# Patient Record
Sex: FEMALE | Race: WHITE | NOT HISPANIC OR LATINO
[De-identification: names, ages, dates, MRNs, and addresses within clinical notes are randomized per-mention and may not be internally consistent; named-entity substitution may affect disease eponyms.]

---

## 2020-08-10 ENCOUNTER — TRANSCRIPTION ENCOUNTER (OUTPATIENT)
Age: 45
End: 2020-08-10

## 2021-04-14 ENCOUNTER — TRANSCRIPTION ENCOUNTER (OUTPATIENT)
Age: 46
End: 2021-04-14

## 2021-10-14 PROBLEM — Z00.00 ENCOUNTER FOR PREVENTIVE HEALTH EXAMINATION: Status: ACTIVE | Noted: 2021-10-14

## 2021-11-04 ENCOUNTER — APPOINTMENT (OUTPATIENT)
Dept: OBGYN | Facility: CLINIC | Age: 46
End: 2021-11-04
Payer: COMMERCIAL

## 2021-11-04 VITALS
WEIGHT: 185 LBS | HEIGHT: 64 IN | BODY MASS INDEX: 31.58 KG/M2 | DIASTOLIC BLOOD PRESSURE: 80 MMHG | SYSTOLIC BLOOD PRESSURE: 128 MMHG

## 2021-11-04 PROCEDURE — 36415 COLL VENOUS BLD VENIPUNCTURE: CPT

## 2021-11-04 PROCEDURE — 58120 DILATION AND CURETTAGE: CPT

## 2021-11-04 PROCEDURE — 99386 PREV VISIT NEW AGE 40-64: CPT | Mod: 25

## 2021-11-04 PROCEDURE — 82270 OCCULT BLOOD FECES: CPT

## 2021-11-04 NOTE — PHYSICAL EXAM

## 2021-11-04 NOTE — PLAN
[FreeTextEntry1] : LORI RODRIGUEZ 45 year old female menorrhagia\par \par Routine Gyn Exam:\par BSE taught\par Pap/HPV conducted\par Rx given for mammogram and breast sonogram**\par Rx TVS\par D&C done due to menorrhagia\par FSH, LH, Tsh Free T4, Estradiol, CBC anemia panel, HCG\par Rx DEXA Ca/Vit D 1000mcg, nutrition, 30 min weight bearing exercise discussed with patient\par Advised pt. to schedule colonoscopy**\par RTO in 1 year or PRN\par

## 2021-11-04 NOTE — HISTORY OF PRESENT ILLNESS
[FreeTextEntry1] : 2021. 45 year old female  LMP 10/22/21 PMH none. She presents for annual gyn exam and offers no complaints. Regular menses but very heavy goes thru clothes and occasional dizziness. Denies breakthrough bleeding, abnormal vaginal discharge.  She reports no urinary or bowel complaints or blood stools.\par \par Reviewed last mammogram/br sono 2016\par Reviewed last pap smear 2016\par Reviewed last pelvic sonogram from  normal\par Reviewed and assessed need for colon screening 7 years ago\par Reviewed patient's current medications and allergies\par \par Denies changes in medical status, medications, serious illness, hospitalizations and surgeries\par \par No Fam hx of Breast , Colon, Uterine or Ovarian Ca\par

## 2021-11-05 LAB — HPV HIGH+LOW RISK DNA PNL CVX: NOT DETECTED

## 2021-11-12 DIAGNOSIS — B00.1 HERPESVIRAL VESICULAR DERMATITIS: ICD-10-CM

## 2021-11-19 LAB
BASOPHILS # BLD AUTO: 0.08 K/UL
BASOPHILS NFR BLD AUTO: 0.8 %
CYTOLOGY CVX/VAG DOC THIN PREP: ABNORMAL
EOSINOPHIL # BLD AUTO: 0.13 K/UL
EOSINOPHIL NFR BLD AUTO: 1.4 %
ESTRADIOL SERPL-MCNC: 82 PG/ML
FERRITIN SERPL-MCNC: 19 NG/ML
FOLATE SERPL-MCNC: 12.9 NG/ML
FSH SERPL-MCNC: 8.9 IU/L
HAPTOGLOB SERPL-MCNC: 186 MG/DL
HCT VFR BLD CALC: 42.8 %
HGB A MFR BLD: 97.8 %
HGB A2 MFR BLD: 2.2 %
HGB BLD-MCNC: 13.3 G/DL
HGB FRACT BLD-IMP: NORMAL
IMM GRANULOCYTES NFR BLD AUTO: 0.3 %
IRON SATN MFR SERPL: 14 %
IRON SERPL-MCNC: 69 UG/DL
LH SERPL-ACNC: 9.7 IU/L
LYMPHOCYTES # BLD AUTO: 2.84 K/UL
LYMPHOCYTES NFR BLD AUTO: 29.9 %
MAN DIFF?: NORMAL
MCHC RBC-ENTMCNC: 27 PG
MCHC RBC-ENTMCNC: 31.1 GM/DL
MCV RBC AUTO: 86.8 FL
MONOCYTES # BLD AUTO: 0.71 K/UL
MONOCYTES NFR BLD AUTO: 7.5 %
NEUTROPHILS # BLD AUTO: 5.71 K/UL
NEUTROPHILS NFR BLD AUTO: 60.1 %
PLATELET # BLD AUTO: 395 K/UL
RBC # BLD: 4.93 M/UL
RBC # BLD: 4.93 M/UL
RBC # FLD: 19.3 %
RETICS # AUTO: 1.8 %
RETICS AGGREG/RBC NFR: 88.2 K/UL
T4 FREE SERPL-MCNC: 1.3 NG/DL
TIBC SERPL-MCNC: 495 UG/DL
TSH SERPL-ACNC: 0.96 UIU/ML
UIBC SERPL-MCNC: 426 UG/DL
VIT B12 SERPL-MCNC: 571 PG/ML
WBC # FLD AUTO: 9.5 K/UL

## 2021-12-03 ENCOUNTER — ASOB RESULT (OUTPATIENT)
Age: 46
End: 2021-12-03

## 2021-12-03 ENCOUNTER — APPOINTMENT (OUTPATIENT)
Dept: OBGYN | Facility: CLINIC | Age: 46
End: 2021-12-03
Payer: COMMERCIAL

## 2021-12-03 PROCEDURE — 76830 TRANSVAGINAL US NON-OB: CPT

## 2021-12-21 ENCOUNTER — APPOINTMENT (OUTPATIENT)
Dept: OBGYN | Facility: CLINIC | Age: 46
End: 2021-12-21
Payer: COMMERCIAL

## 2021-12-21 ENCOUNTER — ASOB RESULT (OUTPATIENT)
Age: 46
End: 2021-12-21

## 2021-12-21 PROCEDURE — 58340 CATHETER FOR HYSTEROGRAPHY: CPT

## 2021-12-21 PROCEDURE — 76831 ECHO EXAM UTERUS: CPT

## 2022-02-09 DIAGNOSIS — Z30.430 ENCOUNTER FOR INSERTION OF INTRAUTERINE CONTRACEPTIVE DEVICE: ICD-10-CM

## 2022-02-10 ENCOUNTER — ASOB RESULT (OUTPATIENT)
Age: 47
End: 2022-02-10

## 2022-02-10 ENCOUNTER — APPOINTMENT (OUTPATIENT)
Dept: OBGYN | Facility: CLINIC | Age: 47
End: 2022-02-10
Payer: COMMERCIAL

## 2022-02-10 PROCEDURE — 76856 US EXAM PELVIC COMPLETE: CPT

## 2022-02-10 PROCEDURE — 87081 CULTURE SCREEN ONLY: CPT

## 2022-02-10 PROCEDURE — 58300 INSERT INTRAUTERINE DEVICE: CPT

## 2022-02-10 PROCEDURE — 81025 URINE PREGNANCY TEST: CPT

## 2022-02-10 NOTE — PROCEDURE
[IUD Placement] : intrauterine device (IUD) placement [Time out performed] : Pre-procedure time out performed.  Patient's name, date of birth and procedure confirmed. [Abnormal Uterine Bleeding] : abnormal uterine bleeding [Risks] : risks [Benefits] : benefits [Alternatives] : alternatives [Neg Pregnancy Test] : negative pregnancy test [Neg GC/Chlamydia] : negative GC/Chlamydia [Tenaculum] : Tenaculum [Easy Passage] : Easy passage [Mirena IUD] : Mirena IUD [Tolerated Well] : Patient tolerated the procedure well [No Complications] : No complications [de-identified] : [id number] [de-identified] : 04/23 [de-identified] : RTO in 4 weeks

## 2022-03-19 ENCOUNTER — APPOINTMENT (OUTPATIENT)
Dept: OBGYN | Facility: CLINIC | Age: 47
End: 2022-03-19
Payer: COMMERCIAL

## 2022-03-19 VITALS — DIASTOLIC BLOOD PRESSURE: 78 MMHG | SYSTOLIC BLOOD PRESSURE: 124 MMHG

## 2022-03-19 PROCEDURE — 99212 OFFICE O/P EST SF 10 MIN: CPT

## 2022-09-18 DIAGNOSIS — J40 BRONCHITIS, NOT SPECIFIED AS ACUTE OR CHRONIC: ICD-10-CM

## 2022-09-18 RX ORDER — AZITHROMYCIN 250 MG/1
250 TABLET, FILM COATED ORAL
Qty: 1 | Refills: 1 | Status: ACTIVE | COMMUNITY
Start: 2022-09-18 | End: 1900-01-01

## 2022-09-23 ENCOUNTER — EMERGENCY (EMERGENCY)
Facility: HOSPITAL | Age: 47
LOS: 1 days | Discharge: HOME | End: 2022-09-23
Attending: EMERGENCY MEDICINE
Payer: COMMERCIAL

## 2022-09-23 ENCOUNTER — APPOINTMENT (OUTPATIENT)
Dept: ULTRASOUND IMAGING | Facility: CLINIC | Age: 47
End: 2022-09-23

## 2022-09-23 ENCOUNTER — NON-APPOINTMENT (OUTPATIENT)
Age: 47
End: 2022-09-23

## 2022-09-23 VITALS
HEART RATE: 75 BPM | WEIGHT: 175.05 LBS | RESPIRATION RATE: 20 BRPM | TEMPERATURE: 99 F | HEIGHT: 64 IN | OXYGEN SATURATION: 98 % | SYSTOLIC BLOOD PRESSURE: 181 MMHG | DIASTOLIC BLOOD PRESSURE: 106 MMHG

## 2022-09-23 LAB
ALBUMIN SERPL ELPH-MCNC: 4.8 G/DL — SIGNIFICANT CHANGE UP (ref 3.3–5)
ALP SERPL-CCNC: 85 U/L — SIGNIFICANT CHANGE UP (ref 40–120)
ALT FLD-CCNC: 31 U/L — SIGNIFICANT CHANGE UP (ref 10–45)
ANION GAP SERPL CALC-SCNC: 11 MMOL/L — SIGNIFICANT CHANGE UP (ref 5–17)
APPEARANCE UR: ABNORMAL
APTT BLD: 27.6 SEC — SIGNIFICANT CHANGE UP (ref 27.5–35.5)
AST SERPL-CCNC: 20 U/L — SIGNIFICANT CHANGE UP (ref 10–40)
BACTERIA # UR AUTO: NEGATIVE — SIGNIFICANT CHANGE UP
BASOPHILS # BLD AUTO: 0.08 K/UL — SIGNIFICANT CHANGE UP (ref 0–0.2)
BASOPHILS NFR BLD AUTO: 0.6 % — SIGNIFICANT CHANGE UP (ref 0–2)
BILIRUB SERPL-MCNC: 0.4 MG/DL — SIGNIFICANT CHANGE UP (ref 0.2–1.2)
BILIRUB UR-MCNC: NEGATIVE — SIGNIFICANT CHANGE UP
BLD GP AB SCN SERPL QL: NEGATIVE — SIGNIFICANT CHANGE UP
BUN SERPL-MCNC: 14 MG/DL — SIGNIFICANT CHANGE UP (ref 7–23)
CALCIUM SERPL-MCNC: 10 MG/DL — SIGNIFICANT CHANGE UP (ref 8.4–10.5)
CHLORIDE SERPL-SCNC: 104 MMOL/L — SIGNIFICANT CHANGE UP (ref 96–108)
CO2 SERPL-SCNC: 26 MMOL/L — SIGNIFICANT CHANGE UP (ref 22–31)
COLOR SPEC: ABNORMAL
CREAT SERPL-MCNC: 0.55 MG/DL — SIGNIFICANT CHANGE UP (ref 0.5–1.3)
DIFF PNL FLD: ABNORMAL
EGFR: 113 ML/MIN/1.73M2 — SIGNIFICANT CHANGE UP
EOSINOPHIL # BLD AUTO: 0.03 K/UL — SIGNIFICANT CHANGE UP (ref 0–0.5)
EOSINOPHIL NFR BLD AUTO: 0.2 % — SIGNIFICANT CHANGE UP (ref 0–6)
EPI CELLS # UR: 4 /HPF — SIGNIFICANT CHANGE UP
GLUCOSE SERPL-MCNC: 111 MG/DL — HIGH (ref 70–99)
GLUCOSE UR QL: NEGATIVE — SIGNIFICANT CHANGE UP
HCT VFR BLD CALC: 46.4 % — HIGH (ref 34.5–45)
HGB BLD-MCNC: 15.8 G/DL — HIGH (ref 11.5–15.5)
HYALINE CASTS # UR AUTO: 2 /LPF — SIGNIFICANT CHANGE UP (ref 0–2)
IMM GRANULOCYTES NFR BLD AUTO: 1.8 % — HIGH (ref 0–0.9)
INR BLD: 1.12 RATIO — SIGNIFICANT CHANGE UP (ref 0.88–1.16)
KETONES UR-MCNC: NEGATIVE — SIGNIFICANT CHANGE UP
LEUKOCYTE ESTERASE UR-ACNC: ABNORMAL
LYMPHOCYTES # BLD AUTO: 18.9 % — SIGNIFICANT CHANGE UP (ref 13–44)
LYMPHOCYTES # BLD AUTO: 2.58 K/UL — SIGNIFICANT CHANGE UP (ref 1–3.3)
MCHC RBC-ENTMCNC: 30.3 PG — SIGNIFICANT CHANGE UP (ref 27–34)
MCHC RBC-ENTMCNC: 34.1 GM/DL — SIGNIFICANT CHANGE UP (ref 32–36)
MCV RBC AUTO: 88.9 FL — SIGNIFICANT CHANGE UP (ref 80–100)
MONOCYTES # BLD AUTO: 0.67 K/UL — SIGNIFICANT CHANGE UP (ref 0–0.9)
MONOCYTES NFR BLD AUTO: 4.9 % — SIGNIFICANT CHANGE UP (ref 2–14)
NEUTROPHILS # BLD AUTO: 10.04 K/UL — HIGH (ref 1.8–7.4)
NEUTROPHILS NFR BLD AUTO: 73.6 % — SIGNIFICANT CHANGE UP (ref 43–77)
NITRITE UR-MCNC: NEGATIVE — SIGNIFICANT CHANGE UP
NRBC # BLD: 0 /100 WBCS — SIGNIFICANT CHANGE UP (ref 0–0)
PH UR: 6 — SIGNIFICANT CHANGE UP (ref 5–8)
PLATELET # BLD AUTO: 458 K/UL — HIGH (ref 150–400)
POTASSIUM SERPL-MCNC: 4.3 MMOL/L — SIGNIFICANT CHANGE UP (ref 3.5–5.3)
POTASSIUM SERPL-SCNC: 4.3 MMOL/L — SIGNIFICANT CHANGE UP (ref 3.5–5.3)
PROT SERPL-MCNC: 8.4 G/DL — HIGH (ref 6–8.3)
PROT UR-MCNC: NEGATIVE — SIGNIFICANT CHANGE UP
PROTHROM AB SERPL-ACNC: 13 SEC — SIGNIFICANT CHANGE UP (ref 10.5–13.4)
RBC # BLD: 5.22 M/UL — HIGH (ref 3.8–5.2)
RBC # FLD: 12.4 % — SIGNIFICANT CHANGE UP (ref 10.3–14.5)
RBC CASTS # UR COMP ASSIST: 600 /HPF — HIGH (ref 0–4)
RH IG SCN BLD-IMP: POSITIVE — SIGNIFICANT CHANGE UP
SODIUM SERPL-SCNC: 141 MMOL/L — SIGNIFICANT CHANGE UP (ref 135–145)
SP GR SPEC: 1.01 — SIGNIFICANT CHANGE UP (ref 1.01–1.02)
UROBILINOGEN FLD QL: NEGATIVE — SIGNIFICANT CHANGE UP
WBC # BLD: 13.65 K/UL — HIGH (ref 3.8–10.5)
WBC # FLD AUTO: 13.65 K/UL — HIGH (ref 3.8–10.5)
WBC UR QL: 3 /HPF — SIGNIFICANT CHANGE UP (ref 0–5)

## 2022-09-23 PROCEDURE — 87086 URINE CULTURE/COLONY COUNT: CPT

## 2022-09-23 PROCEDURE — 85025 COMPLETE CBC W/AUTO DIFF WBC: CPT

## 2022-09-23 PROCEDURE — U0003: CPT

## 2022-09-23 PROCEDURE — 99285 EMERGENCY DEPT VISIT HI MDM: CPT | Mod: 25

## 2022-09-23 PROCEDURE — 93005 ELECTROCARDIOGRAM TRACING: CPT

## 2022-09-23 PROCEDURE — 76830 TRANSVAGINAL US NON-OB: CPT

## 2022-09-23 PROCEDURE — 96374 THER/PROPH/DIAG INJ IV PUSH: CPT

## 2022-09-23 PROCEDURE — 86901 BLOOD TYPING SEROLOGIC RH(D): CPT

## 2022-09-23 PROCEDURE — 87186 SC STD MICRODIL/AGAR DIL: CPT

## 2022-09-23 PROCEDURE — 99285 EMERGENCY DEPT VISIT HI MDM: CPT

## 2022-09-23 PROCEDURE — 96375 TX/PRO/DX INJ NEW DRUG ADDON: CPT

## 2022-09-23 PROCEDURE — 86850 RBC ANTIBODY SCREEN: CPT

## 2022-09-23 PROCEDURE — 81001 URINALYSIS AUTO W/SCOPE: CPT

## 2022-09-23 PROCEDURE — 85610 PROTHROMBIN TIME: CPT

## 2022-09-23 PROCEDURE — 86900 BLOOD TYPING SEROLOGIC ABO: CPT

## 2022-09-23 PROCEDURE — 87077 CULTURE AEROBIC IDENTIFY: CPT

## 2022-09-23 PROCEDURE — U0005: CPT

## 2022-09-23 PROCEDURE — 85730 THROMBOPLASTIN TIME PARTIAL: CPT

## 2022-09-23 PROCEDURE — 80053 COMPREHEN METABOLIC PANEL: CPT

## 2022-09-23 RX ORDER — ONDANSETRON 8 MG/1
4 TABLET, FILM COATED ORAL ONCE
Refills: 0 | Status: COMPLETED | OUTPATIENT
Start: 2022-09-23 | End: 2022-09-23

## 2022-09-23 RX ORDER — ACETAMINOPHEN 500 MG
1000 TABLET ORAL ONCE
Refills: 0 | Status: COMPLETED | OUTPATIENT
Start: 2022-09-23 | End: 2022-09-23

## 2022-09-23 RX ADMIN — ONDANSETRON 4 MILLIGRAM(S): 8 TABLET, FILM COATED ORAL at 22:24

## 2022-09-23 RX ADMIN — Medication 400 MILLIGRAM(S): at 22:24

## 2022-09-23 NOTE — ED PROVIDER NOTE - RAPID ASSESSMENT
45 y/o F with no pertinent PMHx for heavy vaginal bleeding, along with mild cramping and dizziness since yesterday. Pt reports that she had throat infection and was given abx and steroids. Denies heavy bleeding in the past. Has had IUD. Pt reports 4 pads in the past 12 hours. Denies use of blood thinners. OBGYN: Dr. Dailey. Pt appears to be in no acute distress. 45 y/o F with no pertinent PMHx for heavy vaginal bleeding, along with mild cramping and dizziness since yesterday. Pt reports that she had throat infection and was given abx and steroids. Denies heavy bleeding in the past. Has had IUD. Pt reports 4 pads in the past 12 hours. Denies use of blood thinners. OBGYN: Dr. Dailey. Pt appears to be in no acute distress.    César MONK (Scribe) have documented this rapid assessment note under the dictation of Alvaro Rodriguez) which has been reviewed and affirmed to be accurate. Patient was seen as a QPA patient. The patient will be seen and further worked up in the main emergency department and their care will be completed by the main emergency department team along with a thorough physical exam. Receiving team will follow up on labs, analgesia, any clinical imaging, reassess and disposition as clinically indicated, all decisions regarding the progression of care will be made at their discretion. 45 y/o F with no pertinent PMHx for heavy vaginal bleeding, along with mild cramping and dizziness since yesterday. Reports heavy bleeding yesterday soaking 1 pad per hour for 10 hours. Today 4 pads in the past 12 hours Denies heavy bleeding in the past. Has IUD. Denies use of blood thinners. OBGYN: Dr. Dailey who referred pt to ER and team aware pt is triage. Pt appears to be in no acute distress.    ICésar (Scribe) have documented this rapid assessment note under the dictation of Alvaro Rodriguez (PA) which has been reviewed and affirmed to be accurate. Patient was seen as a QPA patient. The patient will be seen and further worked up in the main emergency department and their care will be completed by the main emergency department team along with a thorough physical exam. Receiving team will follow up on labs, analgesia, any clinical imaging, reassess and disposition as clinically indicated, all decisions regarding the progression of care will be made at their discretion.    Alvaro Rodriguez (PA) note: This scribe's documentation has been prepared under my direction and personally reviewed by me. The patient will be seen and further worked up in the main emergency department and their care will be completed by the main emergency department team along with a thorough physical exam. Receiving team will follow up on labs, analgesia, any clinical imaging, reassess and disposition as clinically indicated, all decisions regarding the progression of care will be made at their discretion.

## 2022-09-23 NOTE — ED ADULT TRIAGE NOTE - NSWEIGHTCALCTOOLDRUG_GEN_A_CORE
[Weight management counseling provided] : Weight management [Weight Self Once Weekly] : Weight self once weekly [Sleep ___ hours/day] : Sleep [unfilled] hours/day [Engage in a relaxing activity] : Engage in a relaxing activity [Plan in advance] : Plan in advance [None] : None [Good understanding] : Patient has a good understanding of lifestyle changes and the steps needed to achieve self management goals [ - Annual Alcohol Misuse Screening] : Annual Alcohol Misuse Screening [ - Behavioral Counseling for Obesity (Face-to-Face for 15 Minutes)] : Behavioral Counseling for Obesity (Face-to-Face for 15 Minutes) [ - Annual Depression Screening] : Annual Depression Screening  used

## 2022-09-23 NOTE — ED PROVIDER NOTE - PATIENT PORTAL LINK FT
You can access the FollowMyHealth Patient Portal offered by [facility] by registering at the following website: [email or web address]. By joining [facility]’s FollowMyHealth portal, you will also be able to view your health information using other applications (apps) compatible with our system.

## 2022-09-23 NOTE — ED PROVIDER NOTE - PROGRESS NOTE DETAILS
ALFREDO Lambert; spoke to pt about ultrasound result IUD showed low uterine lying IUD. likely will need replacement. Pt in no acute distress and no pain, reports only mild bleeding. hgb 15.  pt comfortable going home and following up with dr. meza and will discuss strict return precautions. ALFREDO Lambert; pt also reported complete resolution of headache

## 2022-09-23 NOTE — ED PROVIDER NOTE - OBJECTIVE STATEMENT
46y Female no pmhx presents to ed with heavy  vaginal bleeding. pt was sick last wk went to urgent care weds for fever sore throat and was given steroids and abx, had negative strep and covid. since yesterday has had heavy menstrual bleeding. LMP 1 month ago. Has had an IUD since January and since then her cycles have been light. also endorses light headedness and headache. denies fever, chills, sob, abdominal pain, back pain. no hx of miscarriages. obgyn dr meza sent in to get tvus to make sure IUD in place and r/o miscarriage vs ectopic.

## 2022-09-23 NOTE — ED PROVIDER NOTE - CLINICAL SUMMARY MEDICAL DECISION MAKING FREE TEXT BOX
45 yo no pmhx presents to ed with one day of heavy vaginal bleeding. pt has IUD and has had light menstrual cycles with minimal cramping since placement. yesterday went through 10 pads with dizziness and headache and dr. meza obyoly sent in to get tvus to confirm iud placement and r/o ectopic vs miscarriage. cbc to check hgb, tvus, and pending results likely dc home with outpatient follow up obgyn 45 yo no pmhx presents to ed with one day of heavy vaginal bleeding. pt has IUD and has had light menstrual cycles with minimal cramping since placement. yesterday went through 10 pads with dizziness and headache and dr. meza obyoly sent in to get tvus to confirm iud placement and r/o ectopic vs miscarriage. cbc to check hgb, tvus, and pending results likely dc home with outpatient follow up obgyn    CT showed IUD low uterine lying

## 2022-09-23 NOTE — ED PROVIDER NOTE - NSFOLLOWUPINSTRUCTIONS_ED_ALL_ED_FT
You were seen today in the ED for vaginal bleeding. You got a transvaginal ultrasound which showed a low lying IUD. Your hemoglobin was checked and was normal.     Please follow up with your OBGYN within the next 3-5 days.     Please return if you have worsening shortness of breath, chest pain, uncontrollable bleeding, vaginal pain or discharge or any other concerning symptoms.

## 2022-09-23 NOTE — ED PROVIDER NOTE - NS ED ROS FT
General: denies fever, chills  HENT: denies nasal congestion, rhinorrhea  Eyes: denies visual changes, blurred vision  CV: denies chest pain, palpitations  Resp: denies difficulty breathing, cough  Abdominal: + abdominal pain, nausea. denies diarrhea.  : +vaginal bleeding 10 pads yesterday. denies vaginal discharge or dysuria.  MSK: denies muscle aches, leg swelling  Neuro: denies headaches, numbness, tingling  Skin: denies rashes, bruises

## 2022-09-23 NOTE — ED PROVIDER NOTE - ATTENDING CONTRIBUTION TO CARE
Pt with VB in setting of known IUD sent by her Gyn for check and labs r/o anemia and IUD misplacement.  Pt appears well, nontoxic, pink conj, no respiratory distress.

## 2022-09-23 NOTE — ED PROVIDER NOTE - PHYSICAL EXAMINATION
GENERAL: well appearing in no acute distress, non-toxic appearing  HEAD: normocephalic, atraumatic  HEENT: normal conjunctiva, oral mucosa moist, uvula midline, no tonsilar exudates, neck supple, no JVD  CARDIAC: regular rate and rhythm, normal S1S2, no appreciable murmurs  PULM: normal breath sounds, clear to ascultation bilaterally, no rales, rhonchi, wheezing  GI: abdomen nondistended, soft, nontender, no guarding, rebound tenderness  : XXX  NEURO: no focal motor or sensory deficits, CN2-12 intact, normal speech, PERRLA, EOMI, normal gait, AAOx3  MSK: no peripheral edema, no calf tenderness b/l  SKIN: well-perfused, extremities warm, no visible rashes  PSYCH: appropriate mood and affect

## 2022-09-24 VITALS
HEART RATE: 76 BPM | OXYGEN SATURATION: 98 % | TEMPERATURE: 98 F | SYSTOLIC BLOOD PRESSURE: 135 MMHG | DIASTOLIC BLOOD PRESSURE: 86 MMHG | RESPIRATION RATE: 18 BRPM

## 2022-09-24 LAB — SARS-COV-2 RNA SPEC QL NAA+PROBE: SIGNIFICANT CHANGE UP

## 2022-09-24 PROCEDURE — 76830 TRANSVAGINAL US NON-OB: CPT | Mod: 26

## 2022-09-24 NOTE — ED ADULT NURSE NOTE - OBJECTIVE STATEMENT
45y/o F coming to the ED c.o heavy vaginal bleeding. Pt states she was @ urgent care wednesday for fever/sore throat & given steriods & ABX. Pt states since yesteday shes been having heavy menstrual bleeding, changing her pad every hour a/w lightheadedness & HA. Pt called OBGYN who told her to come to the ED. Pt denies any chills/SOB/abdominal pain/back pain. On exam, pt is breathing spontaneously, able to speak full sentences w/o difficulty, saturating 98% RA. IV placed, labs collected and sent. Pt to US.

## 2022-09-24 NOTE — ED ADULT NURSE NOTE - NSIMPLEMENTINTERV_GEN_ALL_ED
Implemented All Universal Safety Interventions:  [place] to call system. Call bell, personal items and telephone within reach. Instruct patient to call for assistance. Room bathroom lighting operational. Non-slip footwear when patient is off stretcher. Physically safe environment: no spills, clutter or unnecessary equipment. Stretcher in lowest position, wheels locked, appropriate side rails in place.

## 2022-09-24 NOTE — ED ADULT NURSE NOTE - CAS ELECT INFOMATION PROVIDED
Continues on Nutrition Supplement & Diet as Ordered; Education Provided on Need for Supplementation DC instructions

## 2022-09-24 NOTE — ED ADULT NURSE NOTE - TEMPLATE LIST FOR HEAD TO TOE ASSESSMENT
Coronavirus (COVID-19) Notification    Reason for call  Notify of POSITIVE  COVID-19 lab result, assess symptoms,  review [facility] recommendations    Lab Result   Lab test for 2019-nCoV rRt-PCR or SARS-COV-2 PCR  Oropharyngeal AND/OR nasopharyngeal swabs were POSITIVE for 2019-nCoV RNA [OR] SARS-COV-2 RNA (COVID-19) RNA     We have been unable to reach Patient by phone at this time to notify of their Positive COVID-19 result.  Left voicemail message requesting a call back between 8 am to 6:30 p.m. to [phone] [facility] for results.   (Weekends, this line is available from 10A to 6:30P)     POSITIVE COVID-19 Letter sent.    Linsey Echols, MSN, RN     General

## 2022-09-26 ENCOUNTER — NON-APPOINTMENT (OUTPATIENT)
Age: 47
End: 2022-09-26

## 2022-09-26 NOTE — ED POST DISCHARGE NOTE - DETAILS
9/26/22: LM on pts VM to call back admin line in regards to results. Awais Harkins PA-C 9/26/22: Pt called back, asymptomatic, denies any urinary complaints. Given no sxs and colony count <100k will hold off on abx at this time. Has f/u with GYN tomorrow, advised repeating in office. Agrees with plan. Awais Harkins PA-C

## 2022-09-27 ENCOUNTER — APPOINTMENT (OUTPATIENT)
Dept: OBGYN | Facility: CLINIC | Age: 47
End: 2022-09-27

## 2022-09-27 DIAGNOSIS — N93.9 ABNORMAL UTERINE AND VAGINAL BLEEDING, UNSPECIFIED: ICD-10-CM

## 2022-09-27 LAB — HCG UR QL: NEGATIVE

## 2022-09-27 PROCEDURE — 81025 URINE PREGNANCY TEST: CPT

## 2022-09-27 PROCEDURE — 58301 REMOVE INTRAUTERINE DEVICE: CPT

## 2022-09-27 PROCEDURE — 58100 BIOPSY OF UTERUS LINING: CPT

## 2022-09-27 PROCEDURE — 99213 OFFICE O/P EST LOW 20 MIN: CPT | Mod: 25

## 2022-09-27 PROCEDURE — 36415 COLL VENOUS BLD VENIPUNCTURE: CPT

## 2022-09-28 LAB
ESTRADIOL SERPL-MCNC: 54 PG/ML
FSH SERPL-MCNC: 9.5 IU/L
LH SERPL-ACNC: 4.5 IU/L
T4 FREE SERPL-MCNC: 1.4 NG/DL
TSH SERPL-ACNC: 0.71 UIU/ML

## 2022-09-29 ENCOUNTER — APPOINTMENT (OUTPATIENT)
Dept: OBGYN | Facility: CLINIC | Age: 47
End: 2022-09-29

## 2022-09-29 ENCOUNTER — NON-APPOINTMENT (OUTPATIENT)
Age: 47
End: 2022-09-29

## 2022-11-09 ENCOUNTER — APPOINTMENT (OUTPATIENT)
Dept: OBGYN | Facility: CLINIC | Age: 47
End: 2022-11-09

## 2022-12-12 ENCOUNTER — ASOB RESULT (OUTPATIENT)
Age: 47
End: 2022-12-12

## 2022-12-12 ENCOUNTER — APPOINTMENT (OUTPATIENT)
Dept: OBGYN | Facility: CLINIC | Age: 47
End: 2022-12-12

## 2022-12-12 LAB
HCG UR QL: NEGATIVE
QUALITY CONTROL: YES

## 2022-12-12 PROCEDURE — 81025 URINE PREGNANCY TEST: CPT

## 2022-12-12 PROCEDURE — ZZZZZ: CPT

## 2022-12-12 PROCEDURE — 58340 CATHETER FOR HYSTEROGRAPHY: CPT

## 2022-12-12 PROCEDURE — 76831 ECHO EXAM UTERUS: CPT

## 2022-12-12 NOTE — PROCEDURE
[Saline Infusion Sonography] : saline infusion sonography [FreeTextEntry3] : 12/12/2022. LORI RODRIGUEZ 47 year year old female presents for a sonohysterogram due to aub/ poss endom polyp\par Discussed risk of bleeding or infection\par Under sterile technique\par A speculum was placed in the vagina and the Cervix was identified and prepped with [place]\par SHG catheter was threaded through the external os until endometrial location was felt\par The balloon was then inflated with .5 cc of saline\par The transvaginal probe was then inserted into the vagina by the tech\par The catheter and balloon was localized\par Saline was gently instilled while the imaging was performed\par The endometrial cavity was fully visualized\par Findings: endom polyp anterior 5mm\par Plan: Operative Hysteroscopy Removal of Polyp and D&C, insertion of mirena IUD\par Pt tolerated procedure well and was given instructions prior to discharge\par

## 2023-01-13 ENCOUNTER — OUTPATIENT (OUTPATIENT)
Dept: OUTPATIENT SERVICES | Facility: HOSPITAL | Age: 48
LOS: 1 days | End: 2023-01-13
Payer: COMMERCIAL

## 2023-01-13 VITALS
RESPIRATION RATE: 14 BRPM | OXYGEN SATURATION: 97 % | TEMPERATURE: 98 F | HEIGHT: 64 IN | SYSTOLIC BLOOD PRESSURE: 130 MMHG | WEIGHT: 195.11 LBS | HEART RATE: 88 BPM | DIASTOLIC BLOOD PRESSURE: 85 MMHG

## 2023-01-13 DIAGNOSIS — Z98.890 OTHER SPECIFIED POSTPROCEDURAL STATES: Chronic | ICD-10-CM

## 2023-01-13 DIAGNOSIS — Z01.818 ENCOUNTER FOR OTHER PREPROCEDURAL EXAMINATION: ICD-10-CM

## 2023-01-13 DIAGNOSIS — N84.0 POLYP OF CORPUS UTERI: ICD-10-CM

## 2023-01-13 LAB
ANION GAP SERPL CALC-SCNC: 14 MMOL/L — SIGNIFICANT CHANGE UP (ref 5–17)
BLD GP AB SCN SERPL QL: NEGATIVE — SIGNIFICANT CHANGE UP
BUN SERPL-MCNC: 10 MG/DL — SIGNIFICANT CHANGE UP (ref 7–23)
CALCIUM SERPL-MCNC: 9.6 MG/DL — SIGNIFICANT CHANGE UP (ref 8.4–10.5)
CHLORIDE SERPL-SCNC: 102 MMOL/L — SIGNIFICANT CHANGE UP (ref 96–108)
CO2 SERPL-SCNC: 21 MMOL/L — LOW (ref 22–31)
CREAT SERPL-MCNC: 0.54 MG/DL — SIGNIFICANT CHANGE UP (ref 0.5–1.3)
EGFR: 113 ML/MIN/1.73M2 — SIGNIFICANT CHANGE UP
GLUCOSE SERPL-MCNC: 106 MG/DL — HIGH (ref 70–99)
HCT VFR BLD CALC: 42.8 % — SIGNIFICANT CHANGE UP (ref 34.5–45)
HGB BLD-MCNC: 14.9 G/DL — SIGNIFICANT CHANGE UP (ref 11.5–15.5)
MCHC RBC-ENTMCNC: 30.5 PG — SIGNIFICANT CHANGE UP (ref 27–34)
MCHC RBC-ENTMCNC: 34.8 GM/DL — SIGNIFICANT CHANGE UP (ref 32–36)
MCV RBC AUTO: 87.5 FL — SIGNIFICANT CHANGE UP (ref 80–100)
NRBC # BLD: 0 /100 WBCS — SIGNIFICANT CHANGE UP (ref 0–0)
PLATELET # BLD AUTO: 387 K/UL — SIGNIFICANT CHANGE UP (ref 150–400)
POTASSIUM SERPL-MCNC: 3.9 MMOL/L — SIGNIFICANT CHANGE UP (ref 3.5–5.3)
POTASSIUM SERPL-SCNC: 3.9 MMOL/L — SIGNIFICANT CHANGE UP (ref 3.5–5.3)
RBC # BLD: 4.89 M/UL — SIGNIFICANT CHANGE UP (ref 3.8–5.2)
RBC # FLD: 12.5 % — SIGNIFICANT CHANGE UP (ref 10.3–14.5)
RH IG SCN BLD-IMP: POSITIVE — SIGNIFICANT CHANGE UP
SODIUM SERPL-SCNC: 137 MMOL/L — SIGNIFICANT CHANGE UP (ref 135–145)
WBC # BLD: 8.4 K/UL — SIGNIFICANT CHANGE UP (ref 3.8–10.5)
WBC # FLD AUTO: 8.4 K/UL — SIGNIFICANT CHANGE UP (ref 3.8–10.5)

## 2023-01-13 PROCEDURE — 86901 BLOOD TYPING SEROLOGIC RH(D): CPT

## 2023-01-13 PROCEDURE — 86900 BLOOD TYPING SEROLOGIC ABO: CPT

## 2023-01-13 PROCEDURE — G0463: CPT

## 2023-01-13 PROCEDURE — 85027 COMPLETE CBC AUTOMATED: CPT

## 2023-01-13 PROCEDURE — 86850 RBC ANTIBODY SCREEN: CPT

## 2023-01-13 PROCEDURE — 80048 BASIC METABOLIC PNL TOTAL CA: CPT

## 2023-01-13 RX ORDER — SODIUM CHLORIDE 9 MG/ML
3 INJECTION INTRAMUSCULAR; INTRAVENOUS; SUBCUTANEOUS EVERY 8 HOURS
Refills: 0 | Status: DISCONTINUED | OUTPATIENT
Start: 2023-02-03 | End: 2023-02-18

## 2023-01-13 RX ORDER — SODIUM CHLORIDE 9 MG/ML
1000 INJECTION, SOLUTION INTRAVENOUS
Refills: 0 | Status: DISCONTINUED | OUTPATIENT
Start: 2023-02-03 | End: 2023-02-18

## 2023-01-13 NOTE — H&P PST ADULT - PROBLEM SELECTOR PLAN 1
Procedure: D&C Operative hysteroscopy endometrial polyp removal and IUD insertion 2/3/23.  PST labs pending  AC: None  Medication changes: Fish oil and NSADis last dose- 7 days prior to Sx  Covid swab 1/31/23 Procedure: D&C Operative hysteroscopy endometrial polyp removal and IUD insertion 2/3/23.  PST labs pending  AC: None  Medication changes: Fish oil and NSAIDs last dose- 7 days prior to Sx  Covid swab 1/31/23

## 2023-01-13 NOTE — H&P PST ADULT - NSANTHOSAYNRD_GEN_A_CORE
No. MAGDALENO screening performed.  STOP BANG Legend: 0-2 = LOW Risk; 3-4 = INTERMEDIATE Risk; 5-8 = HIGH Risk

## 2023-01-13 NOTE — H&P PST ADULT - NEUROLOGICAL
eMERGENCY dEPARTMENT eNCOUnter      CHIEF COMPLAINT    Generalized weakness, frequent falls    HPI    Kia Jane is a 62 year old female who presents to the emergency department today for evaluation of generalized weakness and frequent falls. Patient and her  provides the history. The patient has been apparently becoming more generally weak over the past few days. She has noticed specifically bilateral leg weakness. She states that she has had one fall each day for the past 3 days including today. She states that she has not had any injuries from the falls and does not believe that she hit her head in any of the falls although she is not certain. The  has not witnessed any of these falls, he states he was not home at the time. He states however that during other times during the day when he tries to help her around the house and get her to move he notes that her legs are not very steady and oftentimes buckle out on her. The patient states that she feels like her legs gave way with each of her falls. She denies any syncope. No chest pain or shortness of breath. No dizziness or lightheadedness. No headaches. No vision changes.  feels like the patient may be slightly more confused than normal. There has been no speech changes. Patient has been taking multiple pain medications for chronic pain related to prior spine surgeries. She has not had any recent increases or additions of medications,  reports that since January she has been off OxyContin but prior to that had been on that in addition to her Dilaudid, and other medications. She does not manage her own medications, he takes care of getting these for her and does not feel like she would have taken excess. He has not noticed any recent fevers or chills. The patient denies any dysuria. No chest pain or shortness of breath. No nausea or vomiting. No increase in back pain.    ALLERGIES    ALLERGIES:   Allergen Reactions   • Leyda  [Morphine Sulfate] SWELLING     Morphine caused feet/ankles to swell;facial flushing   Tolerates oxycodone   • Augmentin [Amoxicillin-Pot Clavulanate] GI UPSET and DIARRHEA   • Cat Dander Cough   • Dog Dander HIVES   • Doxycycline GI UPSET   • Keflex GI UPSET, DIZZINESS and HEADACHES   • Minocycline ARTHRALGIA     Joint pain  6/30/11 - not sure of reaction but states it was not joint pain   • Sulfa Antibiotics      Stomach upset, diarrhea   • Zanaflex [Tizanidine Hydrochloride]      Confusion, strange side effects   • Celecoxib GI UPSET     celebrex   • Cortisone [Cortisone Acetate] ERYTHEMA     face/neck flushing   • Opioid Analgesics GI UPSET     codeine       CURRENT MEDICATIONS    Current Facility-Administered Medications   Medication Dose Route Frequency Provider Last Rate Last Dose   • sodium chloride (PF) 0.9 % injection 2 mL  2 mL Injection 2 times per day Vianney Hernández PA-C   2 mL at 09/02/17 2143   • sodium chloride (PF) 0.9 % injection 2 mL  2 mL Injection PRN Vianney Hernández PA-C       • HYDROmorphone (DILAUDID) tablet 2 mg  2 mg Oral Q4H PRN Xavier King MD   2 mg at 09/02/17 2202   • hypromellose (GENTEAL) 0.3 % ophthalmic gel 1 drop  1 drop Both Eyes Daily PRN Xavier King MD       • ketotifen (ZADITOR) 0.025 % ophthalmic solution 1 drop  1 drop Both Eyes BID Xavier King MD       • prochlorperazine (COMPAZINE) tablet 10 mg  10 mg Oral Q8H PRN Xavier King MD       • hydrOXYzine (ATARAX) tablet 25 mg  25 mg Oral Q4H PRN Xavier King MD       • [START ON 9/3/2017] meloxicam (MOBIC) tablet 15 mg  15 mg Oral Daily Xavier King MD       • metoPROLOL (LOPRESSOR) tablet 25 mg  25 mg Oral 2 times per day Xavier King MD   25 mg at 09/02/17 2140   • [START ON 9/3/2017] pantoprazole (PROTONIX) EC tablet 40 mg  40 mg Oral Daily Xavier King MD       • [START ON 9/3/2017] valACYclovir (VALTREX) tablet 1,000 mg  1,000 mg Oral Daily Xavier King MD       • clindamycin (CLEOCIN T) 1 % topical solution 1  application  1 application Topical BID Xavier King MD       • nortriptyline (PAMELOR) capsule 100 mg  100 mg Oral Nightly Xavier King MD   100 mg at 09/02/17 2141   • cycloSPORINE (RESTASIS) 0.05 % ophthalmic emulsion 1 drop  1 drop Both Eyes BID Xavier King MD       • bisacodyl (DULCOLAX) EC tablet 10 mg  10 mg Oral Nightly Xavier King MD   10 mg at 09/02/17 2140   • gabapentin (NEURONTIN) capsule 600 mg  600 mg Oral BID Xavier King MD   600 mg at 09/02/17 2145   • polyethylene glycol (GLYCOLAX, MIRALAX) packet 17 g  17 g Oral BID Xavier King MD       • [START ON 9/3/2017] vitamin - therapeutic multivitamins w/minerals (CENTRUM SILVER,THERA-M) 1 tablet  1 tablet Oral Daily Xavier King MD       • sodium chloride 0.9 % flush bag 500 mL  500 mL Intravenous PRN Xavier King MD       • potassium chloride (K-DUR,KLOR-CON) CR tablet 20 mEq  20 mEq Oral Q4H PRN Xavier King MD       • potassium chloride (KLOR-CON) packet 20 mEq  20 mEq Per NG tube Q4H PRN Xavier King MD       • potassium chloride 20 mEq/100mL IVPB premix  20 mEq Intravenous Q4H PRN Xavier King MD       • potassium chloride (K-DUR,KLOR-CON) CR tablet 40 mEq  40 mEq Oral Q4H PRN Xavier King MD       • potassium chloride (KLOR-CON) packet 40 mEq  40 mEq Per NG tube Q4H PRN Xavier King MD       • potassium chloride 20 mEq/100mL IVPB premix  40 mEq Intravenous Q4H PRN Xavier King MD       • enoxaparin (LOVENOX) injection 40 mg  40 mg Subcutaneous Q24H Xavier King MD   40 mg at 09/02/17 2140   • acetaminophen (TYLENOL) tablet 650 mg  650 mg Oral Q4H PRN Xavier King MD       • magnesium sulfate 1 g in dextrose 5% 100 mL IVPB premix  1 g Intravenous Daily PRN Xavier King MD       • magnesium sulfate 2 g in sodium chloride 0.9% IVPB  2 g Intravenous Daily PRN Xavier King MD       • magnesium sulfate 2 g in sodium chloride 0.9% IVPB  2 g Intravenous Q4H PRN Xavier King MD       • sodium chloride 0.9% infusion   Intravenous Continuous Xavier King MD  100 mL/hr at 09/02/17 2134     • [START ON 9/3/2017] aspirin (ECOTRIN) enteric coated tablet 81 mg  81 mg Oral Daily Xavier King MD       • atorvastatin (LIPITOR) tablet 80 mg  80 mg Oral Nightly Xavier King MD   80 mg at 09/02/17 2140   • clotrimazole-betamethasone (LOTRISONE) 1-0.05 % cream   Topical 2 times per day Xavier King MD       • [START ON 9/3/2017] metaxalone (SKELAXIN) tablet 800 mg  800 mg Oral 4x Daily Xavier King MD         Facility-Administered Medications Ordered in Other Encounters   Medication Dose Route Frequency Provider Last Rate Last Dose   • LIDOCAINE HCL 4 % EX SOLN Pyxis Override                PAST MEDICAL HISTORY    Past Medical History:   Diagnosis Date   • Anemia    • Anxiety    • Blood transfusion    • CERVICAL DDD    • CHRONIC PAIN    • Compression fracture of thoracic vertebra (CMS/HCC)     t-8   • Gastroesophageal reflux disease    • GERD    • HEMORRHOIDS    • HEREDITARY CORNEAL DYSTROPHY     OS   • HYPERTENSION    • MGD (meibomian gland dysfunction) 12/21/2010   • OSTEOPENIA 9/04    Lumbar   • Pneumonia    • PONV (postoperative nausea and vomiting)    • Rheumatoid arthritis (CMS/HCC)    • Sjogrens syndrome (CMS/HCC)    • STOMACH ULCER 1976    resolved by surgery    • Tear film insufficiency, unspecified 12/21/2010       SURGICAL HISTORY    Past Surgical History:   Procedure Laterality Date   • BREAST SURGERY      left    • CERVICAL FUSION  10/5/12    Anterior C3-4 decompression/fusion   • COLONOSCOPY DIAGNOSTIC  1998, 1999    Polyps   • COLONOSCOPY DIAGNOSTIC  2003    Adenoma   • COLONOSCOPY REMOVE LESION BY SNARE  12/4/2008    Tubular adenoma, poor prep, next exam due in 3 years    • COLONOSCOPY W BIOPSY  10/13/2011    Adenomatous polyp   • DEXA BONE DENSITY AXIAL SKELETON  9/23/08    Normal   • ESOPHAGOGASTRODUODENOSCOPY TRANSORAL FLEX W/BX SINGLE OR MULT  10/13/2011    Gastritis   • INJ PARAVERT FACET ANES  3/23/06    C-facet Injection w/sed     C-3-7          #1   •  LAMINECTOMY,CERVICAL  13    Anterior/posterior lami with fusion   • OFFICE SERVICE OUTSIDE OF REG SCHED HOURS  2011    Rt index fing mass exc    • PAST SURGICAL HISTORY  5/15    Removal of estring from vaginal adhesions, in office   • PAST SURGICAL HISTORY  3/25/15    posterior reexploration and revision of fusion C2-T12, T8&T9 pedicle subtraction osteotomy with C2-T12 pedicle screw fixation T8-T9 to T12 on 3/25/15.   • PROXIMAL GASTRECTOMY      Partial stomach resection due to ulcers   • SKIN BIOPSY     • TOTAL ABDOM HYSTERECTOMY      SANCHO w BSO, fibroids       SOCIAL HISTORY    Social History     Social History   • Marital status:      Spouse name: Yoni   • Number of children: 0   • Years of education: 12     Occupational History   • At home full-time      Social History Main Topics   • Smoking status: Former Smoker     Packs/day: 1.00     Years: 30.00     Types: Cigarettes     Quit date: 1999   • Smokeless tobacco: Never Used   • Alcohol use Yes      Comment: \"very occasionally\", now nothing   • Drug use: No   • Sexual activity: Yes     Partners: Male     Birth control/ protection: Surgical     Other Topics Concern   • Blood Transfusions Yes     with stomach surgery in    • Caffeine Yes     2 cokes per day    • Sleep Concern Yes     needs chlor    • Stress Concern Not Asked     variable    • Weight Concern No   • Exercise Yes     walk on the treadmill    • Seat Belt Yes   • Self Exams Yes     monthly      Social History Narrative    Diet Low sodium        Exercise Not regularly        Not working       FAMILY HISTORY    Family History   Problem Relation Age of Onset   • Stroke Father    • Cancer Father      lung  age 68   • Diabetes Father    • Dementia Mother      Dementia Alzheimers, celiac disease, osteoporosis   • Hypertension Brother    • NEGATIVE FAMILY HX OF Other      No breast, ovarian, or colon cancer   • GI Other      1-3 members with celiac sprue    • Neurology  Maternal Grandmother      alzheimer's, and 2 of her sisters   • Ophthalmology Other      Strabismus-brother, niece       REVIEW OF SYSTEMS    Constitutional:  Denies fever or chills.   Eyes:  Denies change in visual acuity.   HENT:  Denies nasal congestion or sore throat.   Respiratory:  Denies cough or shortness of breath.   Cardiovascular:  Denies chest pain or lower extremity edema.   GI:  Denies abdominal pain, nausea, vomiting, bloody stools or diarrhea.   :  Denies dysuria.   Musculoskeletal:  Denies back pain or joint pain.   Integument:  Denies rash.   Neurologic:  See history of present illness.    Psychiatric:  Denies depression or anxiety.     PHYSICAL EXAM    ED Triage Vitals   BP 09/02/17 1547 156/79   Pulse 09/02/17 1547 (!) 48   Resp 09/02/17 1555 20   Temp 09/02/17 1555 98.2 °F (36.8 °C)   SpO2 09/02/17 1547 (!) 85 %      Vitals:    09/02/17 1725 09/02/17 1755 09/02/17 1938 09/02/17 2142   BP: 160/80 181/86 (!) 170/100 185/90   Pulse: 82 78 89    Resp: 25 21 21    Temp:   97.7 °F (36.5 °C)    TempSrc:   Oral    SpO2: 96% 99% 94%    Weight:       Height:           Pulse Ox Interpretation:  Initial pulse ox recorded was 85% on room air, however with adjustment of the pulse oximeter readings immediately improved, I suspect the initial documented reading of 85% is inaccurate.  Constitutional:  Well developed, well nourished. No acute distress, non-toxic appearance.   Eyes:  PERRL, conjunctivae normal.   HENT:  Head atraumatic, no evidence for hematoma, abrasions and no scalp tenderness. External ears normal. External nose normal. Oropharynx moist.  Neck: Normal range of motion. No tenderness over the cervical spine.   Respiratory:  No respiratory distress, normal breath sounds. No crackles. No wheezing.   Cardiovascular:  Normal rate, normal rhythm. No murmurs, gallops, or rubs.  GI:  Soft, nondistended. Normal bowel sounds, nontender. No hepatomegaly, splenomegaly, mass, rebound or guarding.    Musculoskeletal:  No extremity edema, tenderness, or deformities. Neurologic:  Alert & oriented x 3.  She is able to move all 4 extremities and holds them against gravity without difficulty. She has bilateral decreased strength in the lower extremities and upper extremities although lower extremities seem weaker than the upper extremities. She has difficulty following commands at times. She has no ataxia with finger to nose or heel down shin. She has no difficulty with extinction and equal sensation bilateral upper and lower extremities and sides of the face. She does have an apparent right-sided facial droop that the  feels is new. Speech is fluent. She has no word finding difficulties. She is able to raise her eyebrows against resistance without difficulty and keep her eyes closed against resistance without difficulty. Shoulder shrug is equal bilaterally. Tongue protrudes midline. She does appear sleepy but arouses easily and holds a conversation appropriately.  Psychiatric:  Speech and behavior appropriate.     EKG    EKG Interpretation  Time: 16:25 PM  Rate: 87/min  Rhythm: normal sinus rhythm   Abnormality: No STEMI  Reviewed by: Dr. Ochoa    RADIOLOGY    XR Chest AP or PA   Final Result   IMPRESSION: Worsening, now marked elevation right diaphragm      Clear lungs with no infiltrate or effusion.          CT Head Brain   Final Result   IMPRESSION:  No hemorrhage or acute brain finding. Mild atrophy, mild   chronic brain findings. .               LABS    Results for orders placed or performed during the hospital encounter of 09/02/17   CBC & Auto Differential   Result Value    WBC 6.2    RBC 3.94 (L)    HGB 11.1 (L)    HCT 33.2 (L)    MCV 84.3    MCH 28.2    MCHC 33.4    RDW-CV 13.0        DIFF TYPE AUTOMATED DIFFERENTIAL    Neutrophil 82    LYMPH 8    MONO 7    EOSIN 3    BASO 0    Absolute Neutrophil 5.0    Absolute Lymph 0.5 (L)    Absolute Mono 0.4    Absolute Eos 0.2    Absolute Baso 0.0    Prothrombin Time   Result Value    PROTIME 11.6    INR 1.1     Comment: INR Therapeutic Range: 2.0 to 3.0 (2.5 to 3.5 recommended for recurrent thrombotic episodes and mechanical prosthetic heart valves.)    Partial Thromboplastin Time   Result Value    PTT 36 (H)     Comment: PTT  Therapeutic Range:  47-67 seconds.   Comprehensive Metabolic Panel   Result Value    Sodium 126 (L)    Potassium 4.3    Chloride 90 (L)    Carbon Dioxide 30    Anion Gap 10    Glucose 136 (H)    BUN 12    Creatinine 0.76    GFR Estimate,  >90     Comment: eGFR results = or >90 mL/min/1.73m2 = Normal kidney function.    GFR Estimate, Non  83     Comment: eGFR 60 - 89 mL/min/1.73m2 = Mild decrease in kidney function.    BUN/Creatinine Ratio 16    CALCIUM 8.5    TOTAL BILIRUBIN 0.3    AST/SGOT 21    ALT/SGPT 18    ALK PHOSPHATASE 108    TOTAL PROTEIN 6.6    Albumin 3.5 (L)    GLOBULIN 3.1    A/G Ratio, Serum 1.1   Troponin I Ultra Sensitive   Result Value    TROPONIN I <0.02   Urinalysis with Micro & Culture if Indicated   Result Value    COLOR YELLOW    APPEARANCE CLEAR    GLUCOSE(URINE) NEGATIVE    BILIRUBIN NEGATIVE    KETONES NEGATIVE    SPECIFIC GRAVITY <1.005 (L)    BLOOD NEGATIVE    pH 5.5    PROTEIN(URINE) NEGATIVE    UROBILINOGEN 0.2    NITRITE NEGATIVE    LEUKOCYTE ESTERASE NEGATIVE    Squamous EPI'S 1 to 5    RBC NONE SEEN    WBC NONE SEEN    BACTERIA NONE SEEN    Hyaline Casts NONE SEEN    SPECIMEN TYPE URINE, CLEAN CATCH/MIDSTREAM         ED COURSE & MEDICAL DECISION MAKING    62-year-old female patient who presents to the emergency department today for evaluation of generalized weakness and frequent falls. She has a right-sided facial droop which is new apparently over the past few days per the . She has bilateral extremity weakness. Given the facial droop stroke is a possibility. Other etiologies may include metabolic process or medication related or potentially infectious. Head CT  ordered and stroke evaluation labs obtained. Stroke alert not initiated given symptoms being present for the past few days. Urinalysis and chest x-ray ordered for further evaluation.    Head CT is negative for acute findings.  White cell count normal at 6.2. She is not significantly anemic. No significant electrolyte abnormalities other than a sodium of 126. Awaiting urine sample. Chest x-ray is negative. Aspirin ordered for possible stroke as the underlying etiology. Given the generalized weakness and frequent falls patient will be admitted for further evaluation. She and her  were in agreement with this. Discussed with Dr. King, hospitalist who accepts the patient for admission. He requests that I discussed the case with neurology as he is concerned potentially about spinal issues given her prior surgeries. I did speak with Dr. Adame who will further discuss with Dr. King after Dr. King sees the patient.  Awaiting urine at this time.    Impression:  The primary encounter diagnosis was Hyponatremia. Diagnoses of Generalized weakness, Frequent falls, and Facial droop were also pertinent to this visit.    The patient is expected to have a 2 midnight stay in the hospital.     Attending physician:Discussed with Dr. Ochoa.       Vianney Hernández PA-C  09/02/17 4100     sensation intact/responds to pain/responds to verbal commands/no spontaneous movement negative

## 2023-01-13 NOTE — H&P PST ADULT - RESPIRATORY RATE (BREATHS/MIN)
Detail Level: Detailed Depth Of Biopsy: dermis Was A Bandage Applied: Yes X Size Of Lesion In Cm: 0 Biopsy Type: H and E Biopsy Method: Dermablade Anesthesia Type: 1% lidocaine with epinephrine Anesthesia Volume In Cc (Will Not Render If 0): 0.2 Hemostasis: Drysol Wound Care: Petrolatum Dressing: bandage Destruction After The Procedure: No Type Of Destruction Used: Curettage Curettage Text: The wound bed was treated with curettage after the biopsy was performed. Cryotherapy Text: The wound bed was treated with cryotherapy after the biopsy was performed. Electrodesiccation Text: The wound bed was treated with electrodesiccation after the biopsy was performed. Electrodesiccation And Curettage Text: The wound bed was treated with electrodesiccation and curettage after the biopsy was performed. Silver Nitrate Text: The wound bed was treated with silver nitrate after the biopsy was performed. Lab: 6 Lab Facility: 3 Consent: Written consent was obtained and risks were reviewed including but not limited to scarring, infection, bleeding, scabbing, incomplete removal, nerve damage and allergy to anesthesia. Post-Care Instructions: I reviewed with the patient in detail post-care instructions. Patient is to keep the biopsy site dry overnight, and then apply bacitracin twice daily until healed. Patient may apply hydrogen peroxide soaks to remove any crusting. Notification Instructions: Patient will be notified of biopsy results. However, patient instructed to call the office if not contacted within 2 weeks. Billing Type: Third-Party Bill Information: Selecting Yes will display possible errors in your note based on the variables you have selected. This validation is only offered as a suggestion for you. PLEASE NOTE THAT THE VALIDATION TEXT WILL BE REMOVED WHEN YOU FINALIZE YOUR NOTE. IF YOU WANT TO FAX A PRELIMINARY NOTE YOU WILL NEED TO TOGGLE THIS TO 'NO' IF YOU DO NOT WANT IT IN YOUR FAXED NOTE. 14

## 2023-01-13 NOTE — H&P PST ADULT - HISTORY OF PRESENT ILLNESS
48 y/o F with PMHx of uterine polyps with heavy menstruation s/p ED visit 9/24 for heavy VB since 9/22 on Mirena IUD (placed in 2/2022), pelvic sonogram showed slightly low lying IUD and IUD removed in GYN office.  Now presents to PST prior to D&C Operative hysteroscopy endometrial polyp removal and IUD insertion 2/3/23.  Today, patient denies SOB, CP, palpitation, blood in the stool or urine, N/V/D/C, HA, dizziness, seizures. Denies vaginal bleeding/discharge or pelvic pain.    Hx of Covid-19 Infx. 12/2021- HA, fever and body aches, recovered at home  Covid swab on 1/31/23         48 y/o F with PMHx of uterine polyps with heavy menstruation s/p ED visit 9/24/22 for heavy VB on Mirena IUD (placed in 2/2022), pelvic sonogram showed slightly low lying IUD and IUD removed in GYN office.  Now presents to PST prior to D&C Operative hysteroscopy endometrial polyp removal and IUD insertion 2/3/23.  Today, patient denies SOB, CP, palpitation, blood in the stool or urine, N/V/D/C, HA, dizziness, seizures. Denies vaginal bleeding/discharge or pelvic pain.    Hx of Covid-19 Infx. 12/2021- HA, fever and body aches, recovered at home  Covid swab on 1/31/23

## 2023-01-20 PROBLEM — N84.0 POLYP OF CORPUS UTERI: Chronic | Status: ACTIVE | Noted: 2023-01-13

## 2023-01-20 PROBLEM — Z87.42 PERSONAL HISTORY OF OTHER DISEASES OF THE FEMALE GENITAL TRACT: Chronic | Status: ACTIVE | Noted: 2023-01-13

## 2023-01-26 ENCOUNTER — APPOINTMENT (OUTPATIENT)
Dept: OBGYN | Facility: CLINIC | Age: 48
End: 2023-01-26
Payer: COMMERCIAL

## 2023-01-26 PROCEDURE — 99443: CPT | Mod: 95

## 2023-01-26 RX ORDER — MISOPROSTOL 200 UG/1
200 TABLET ORAL
Qty: 2 | Refills: 0 | Status: ACTIVE | COMMUNITY
Start: 2023-01-26 | End: 1900-01-01

## 2023-01-26 NOTE — REASON FOR VISIT
[Home] : at home, [unfilled] , at the time of the visit. [Medical Office: ([facility])___] : at the medical office located in  [Patient] : the patient

## 2023-01-26 NOTE — HISTORY OF PRESENT ILLNESS
[FreeTextEntry1] : Pre-Op Consult: Operative Hysteroscopic Endometrial Polyp Removal\par \par Pt. with known endometrial polyp for hysteroscopic polypectomy and D&C\par Surgery described in detail.\par Procedure to be performed at [facility].\par Discussed risks of procedure to include, but not limited to:\par - blood loss and possible need for transfusion\par - infection\par - perforation with possible injury to viscus or blood vessel that could even require immediate surgical intervention for correction.\par - fluid issues and deficit.\par - inability to dilate or creation of false passage\par -anesthesia\par - inability to remove or fully remove the polyp\par - recurrence of polyps\par - potential malignancy and need for further surgery\par Pt can take Motrin, Tylenol or Advil for pelvic cramping\par \par D/w pt normal vaginal bleeding on and off for 1-2 week. Pt to call me if she has heavy vaginal bleeding, severe pain or fever\par Pt can return to work the day after the procedure\par Nothing per vagina, no strenuous exercise and no tub bathing for 2 weeks after the procedure\par \par Pt given Cytotec to take the night before procedure (400mcg - 2 tabs of 200mcg given to pt)\par I will review pathology results with pt in 7-10 days and sched f/u in 2 weeks\par \par Plan is for Operative Hysteroscopic Polypectomy and D&C and insertion of mirena IUD\par \par 25 min > 50% consultation.\par

## 2023-01-31 ENCOUNTER — OUTPATIENT (OUTPATIENT)
Dept: OUTPATIENT SERVICES | Facility: HOSPITAL | Age: 48
LOS: 1 days | End: 2023-01-31
Payer: COMMERCIAL

## 2023-01-31 DIAGNOSIS — Z98.890 OTHER SPECIFIED POSTPROCEDURAL STATES: Chronic | ICD-10-CM

## 2023-01-31 DIAGNOSIS — Z11.52 ENCOUNTER FOR SCREENING FOR COVID-19: ICD-10-CM

## 2023-01-31 LAB — SARS-COV-2 RNA SPEC QL NAA+PROBE: SIGNIFICANT CHANGE UP

## 2023-01-31 PROCEDURE — C9803: CPT

## 2023-01-31 PROCEDURE — U0003: CPT

## 2023-01-31 PROCEDURE — U0005: CPT

## 2023-02-02 ENCOUNTER — TRANSCRIPTION ENCOUNTER (OUTPATIENT)
Age: 48
End: 2023-02-02

## 2023-02-03 ENCOUNTER — OUTPATIENT (OUTPATIENT)
Dept: OUTPATIENT SERVICES | Facility: HOSPITAL | Age: 48
LOS: 1 days | End: 2023-02-03
Payer: COMMERCIAL

## 2023-02-03 ENCOUNTER — APPOINTMENT (OUTPATIENT)
Dept: OBGYN | Facility: HOSPITAL | Age: 48
End: 2023-02-03
Payer: COMMERCIAL

## 2023-02-03 ENCOUNTER — TRANSCRIPTION ENCOUNTER (OUTPATIENT)
Age: 48
End: 2023-02-03

## 2023-02-03 ENCOUNTER — RESULT REVIEW (OUTPATIENT)
Age: 48
End: 2023-02-03

## 2023-02-03 VITALS
OXYGEN SATURATION: 100 % | RESPIRATION RATE: 16 BRPM | DIASTOLIC BLOOD PRESSURE: 74 MMHG | HEART RATE: 87 BPM | SYSTOLIC BLOOD PRESSURE: 111 MMHG

## 2023-02-03 VITALS
DIASTOLIC BLOOD PRESSURE: 93 MMHG | WEIGHT: 195.11 LBS | RESPIRATION RATE: 16 BRPM | HEIGHT: 64 IN | TEMPERATURE: 98 F | SYSTOLIC BLOOD PRESSURE: 159 MMHG | HEART RATE: 88 BPM | OXYGEN SATURATION: 98 %

## 2023-02-03 DIAGNOSIS — Z98.890 OTHER SPECIFIED POSTPROCEDURAL STATES: Chronic | ICD-10-CM

## 2023-02-03 DIAGNOSIS — N84.0 POLYP OF CORPUS UTERI: ICD-10-CM

## 2023-02-03 PROCEDURE — 58558 HYSTEROSCOPY BIOPSY: CPT

## 2023-02-03 PROCEDURE — 88305 TISSUE EXAM BY PATHOLOGIST: CPT

## 2023-02-03 PROCEDURE — 58300 INSERT INTRAUTERINE DEVICE: CPT

## 2023-02-03 PROCEDURE — 88305 TISSUE EXAM BY PATHOLOGIST: CPT | Mod: 26

## 2023-02-03 DEVICE — IUD MIRENA: Type: IMPLANTABLE DEVICE | Status: FUNCTIONAL

## 2023-02-03 RX ORDER — OXYCODONE HYDROCHLORIDE 5 MG/1
5 TABLET ORAL ONCE
Refills: 0 | Status: DISCONTINUED | OUTPATIENT
Start: 2023-02-03 | End: 2023-02-03

## 2023-02-03 RX ORDER — OMEGA-3 ACID ETHYL ESTERS 1 G
2 CAPSULE ORAL
Qty: 0 | Refills: 0 | DISCHARGE

## 2023-02-03 RX ORDER — HYDROMORPHONE HYDROCHLORIDE 2 MG/ML
0.5 INJECTION INTRAMUSCULAR; INTRAVENOUS; SUBCUTANEOUS
Refills: 0 | Status: DISCONTINUED | OUTPATIENT
Start: 2023-02-03 | End: 2023-02-03

## 2023-02-03 RX ORDER — LIDOCAINE HCL 20 MG/ML
0.2 VIAL (ML) INJECTION ONCE
Refills: 0 | Status: COMPLETED | OUTPATIENT
Start: 2023-02-03 | End: 2023-02-03

## 2023-02-03 RX ORDER — ONDANSETRON 8 MG/1
4 TABLET, FILM COATED ORAL ONCE
Refills: 0 | Status: DISCONTINUED | OUTPATIENT
Start: 2023-02-03 | End: 2023-02-18

## 2023-02-03 RX ORDER — ZINC SULFATE TAB 220 MG (50 MG ZINC EQUIVALENT) 220 (50 ZN) MG
1 TAB ORAL
Qty: 0 | Refills: 0 | DISCHARGE

## 2023-02-03 RX ORDER — APREPITANT 80 MG/1
40 CAPSULE ORAL ONCE
Refills: 0 | Status: COMPLETED | OUTPATIENT
Start: 2023-02-03 | End: 2023-02-03

## 2023-02-03 RX ADMIN — APREPITANT 40 MILLIGRAM(S): 80 CAPSULE ORAL at 14:03

## 2023-02-03 RX ADMIN — SODIUM CHLORIDE 100 MILLILITER(S): 9 INJECTION, SOLUTION INTRAVENOUS at 13:25

## 2023-02-03 RX ADMIN — SODIUM CHLORIDE 3 MILLILITER(S): 9 INJECTION INTRAMUSCULAR; INTRAVENOUS; SUBCUTANEOUS at 13:25

## 2023-02-03 NOTE — ASU DISCHARGE PLAN (ADULT/PEDIATRIC) - CARE PROVIDER_API CALL
Elena Mcelroy)  Obstetrics and Gynecology  [address] [place] [address]  Phone: [phone]  Fax: [phone]  Follow Up Time:

## 2023-02-03 NOTE — PRE-ANESTHESIA EVALUATION ADULT - NSANTHPMHFT_GEN_ALL_CORE
46 y/o F with PMHx of uterine polyps with heavy menstruation s/p ED visit 9/24/22 for heavy VB on Mirena IUD (placed in 2/2022), pelvic sonogram showed slightly low lying IUD and IUD removed in GYN office.  Now presents prior to D&C Operative hysteroscopy endometrial polyp removal and IUD insertion 2/3/23.    ET>4 METS

## 2023-02-03 NOTE — ASU PATIENT PROFILE, ADULT - FALL HARM RISK - UNIVERSAL INTERVENTIONS
Bed in lowest position, wheels locked, appropriate side rails in place/Call bell, personal items and telephone in reach/Instruct patient to call for assistance before getting out of bed or chair/Non-slip footwear when patient is out of bed/[place] to call system/Physically safe environment - no spills, clutter or unnecessary equipment/Purposeful Proactive Rounding/Room/bathroom lighting operational, light cord in reach

## 2023-02-03 NOTE — ASU DISCHARGE PLAN (ADULT/PEDIATRIC) - ACTIVITY LEVEL
No excercise/No heavy lifting/No sports/gym/Nothing per vagina/No tub baths/No tampons/No intercourse

## 2023-02-03 NOTE — ASU DISCHARGE PLAN (ADULT/PEDIATRIC) - NURSING INSTRUCTIONS
OK to take Tylenol/Acetaminophen at _10:10PM_____ for pain and every 6 hours after as needed. OK to take Motrin/Ibuprofen at _10:20PM____ for pain and every 6 hours after as needed. Take pain medicines alternate. Tylenol IV was given at 4:10PM// Toradol IV was given at 4:20PM in OR.

## 2023-02-03 NOTE — ASU DISCHARGE PLAN (ADULT/PEDIATRIC) - NS MD DC FALL RISK RISK
For information on Fall & Injury Prevention, visit: [email or web address] OR  [email or web address] OR  https://www.cdc.gov/steadi/patient.html

## 2023-02-03 NOTE — ASU DISCHARGE PLAN (ADULT/PEDIATRIC) - DISCHARGE PLAN IS COMPLETE AND GIVEN TO PATIENT
Post-Op Assessment Note    CV Status:  Stable  Pain Score: 6 (RESTING COMFORTABLY)    Pain management: satisfactory to patient     Mental Status:  Alert and awake   Hydration Status:  Euvolemic   PONV Controlled:  Controlled   Airway Patency:  Patent   Two or more mitigation strategies used for obstructive sleep apnea   Post Op Vitals Reviewed: Yes      Staff: CRNA         No complications documented      BP   147/79   Temp   98 4   Pulse  72   Resp   14   SpO2   100
: Yes

## 2023-02-10 LAB — SURGICAL PATHOLOGY STUDY: SIGNIFICANT CHANGE UP

## 2023-02-16 ENCOUNTER — APPOINTMENT (OUTPATIENT)
Dept: OBGYN | Facility: CLINIC | Age: 48
End: 2023-02-16

## 2023-03-01 ENCOUNTER — ASOB RESULT (OUTPATIENT)
Age: 48
End: 2023-03-01

## 2023-03-01 ENCOUNTER — APPOINTMENT (OUTPATIENT)
Dept: OBGYN | Facility: CLINIC | Age: 48
End: 2023-03-01
Payer: COMMERCIAL

## 2023-03-01 VITALS
WEIGHT: 190 LBS | HEIGHT: 64 IN | SYSTOLIC BLOOD PRESSURE: 137 MMHG | BODY MASS INDEX: 32.44 KG/M2 | DIASTOLIC BLOOD PRESSURE: 82 MMHG

## 2023-03-01 DIAGNOSIS — N84.0 POLYP OF CORPUS UTERI: ICD-10-CM

## 2023-03-01 DIAGNOSIS — Z97.5 PRESENCE OF (INTRAUTERINE) CONTRACEPTIVE DEVICE: ICD-10-CM

## 2023-03-01 DIAGNOSIS — Z01.419 ENCOUNTER FOR GYNECOLOGICAL EXAMINATION (GENERAL) (ROUTINE) W/OUT ABNORMAL FINDINGS: ICD-10-CM

## 2023-03-01 PROCEDURE — 99396 PREV VISIT EST AGE 40-64: CPT

## 2023-03-01 PROCEDURE — 82270 OCCULT BLOOD FECES: CPT

## 2023-03-01 PROCEDURE — 76830 TRANSVAGINAL US NON-OB: CPT

## 2023-03-02 LAB — HPV HIGH+LOW RISK DNA PNL CVX: NOT DETECTED

## 2023-03-06 LAB — CYTOLOGY CVX/VAG DOC THIN PREP: NORMAL

## 2023-06-12 ENCOUNTER — ASOB RESULT (OUTPATIENT)
Age: 48
End: 2023-06-12

## 2023-06-12 ENCOUNTER — APPOINTMENT (OUTPATIENT)
Dept: OBGYN | Facility: CLINIC | Age: 48
End: 2023-06-12
Payer: COMMERCIAL

## 2023-06-12 PROCEDURE — 76830 TRANSVAGINAL US NON-OB: CPT

## 2023-06-16 ENCOUNTER — NON-APPOINTMENT (OUTPATIENT)
Age: 48
End: 2023-06-16

## 2023-09-15 ENCOUNTER — APPOINTMENT (OUTPATIENT)
Dept: OBGYN | Facility: CLINIC | Age: 48
End: 2023-09-15
Payer: COMMERCIAL

## 2023-09-15 ENCOUNTER — ASOB RESULT (OUTPATIENT)
Age: 48
End: 2023-09-15

## 2023-09-15 VITALS — DIASTOLIC BLOOD PRESSURE: 95 MMHG | SYSTOLIC BLOOD PRESSURE: 148 MMHG

## 2023-09-15 PROCEDURE — 76830 TRANSVAGINAL US NON-OB: CPT

## 2023-09-15 PROCEDURE — 99213 OFFICE O/P EST LOW 20 MIN: CPT

## 2023-09-18 ENCOUNTER — NON-APPOINTMENT (OUTPATIENT)
Age: 48
End: 2023-09-18

## 2023-09-18 DIAGNOSIS — N92.0 EXCESSIVE AND FREQUENT MENSTRUATION WITH REGULAR CYCLE: ICD-10-CM

## 2023-09-18 RX ORDER — TRANEXAMIC ACID 650 MG/1
650 TABLET ORAL
Qty: 30 | Refills: 3 | Status: ACTIVE | COMMUNITY
Start: 2023-09-18 | End: 1900-01-01

## 2023-10-11 ENCOUNTER — APPOINTMENT (OUTPATIENT)
Dept: OBGYN | Facility: CLINIC | Age: 48
End: 2023-10-11

## 2023-12-11 DIAGNOSIS — T30.0 BURN OF UNSPECIFIED BODY REGION, UNSPECIFIED DEGREE: ICD-10-CM

## 2023-12-11 RX ORDER — SILVER SULFADIAZINE 10 MG/G
1 CREAM TOPICAL TWICE DAILY
Qty: 50 | Refills: 0 | Status: ACTIVE | COMMUNITY
Start: 2023-12-11 | End: 1900-01-01

## 2024-03-13 ENCOUNTER — OFFICE (OUTPATIENT)
Dept: URBAN - METROPOLITAN AREA CLINIC 109 | Facility: CLINIC | Age: 49
Setting detail: OPHTHALMOLOGY
End: 2024-03-13
Payer: COMMERCIAL

## 2024-03-13 ENCOUNTER — RX ONLY (RX ONLY)
Age: 49
End: 2024-03-13

## 2024-03-13 DIAGNOSIS — S05.02XA: ICD-10-CM

## 2024-03-13 PROCEDURE — 99213 OFFICE O/P EST LOW 20 MIN: CPT | Performed by: OPHTHALMOLOGY

## 2024-03-15 ENCOUNTER — RX ONLY (RX ONLY)
Age: 49
End: 2024-03-15

## 2024-03-15 ENCOUNTER — OFFICE (OUTPATIENT)
Dept: URBAN - METROPOLITAN AREA CLINIC 109 | Facility: CLINIC | Age: 49
Setting detail: OPHTHALMOLOGY
End: 2024-03-15
Payer: COMMERCIAL

## 2024-03-15 DIAGNOSIS — S05.02XD: ICD-10-CM

## 2024-03-15 PROCEDURE — 99213 OFFICE O/P EST LOW 20 MIN: CPT | Performed by: OPHTHALMOLOGY

## 2024-03-18 ENCOUNTER — OFFICE (OUTPATIENT)
Dept: URBAN - METROPOLITAN AREA CLINIC 109 | Facility: CLINIC | Age: 49
Setting detail: OPHTHALMOLOGY
End: 2024-03-18
Payer: COMMERCIAL

## 2024-03-18 DIAGNOSIS — S05.02XD: ICD-10-CM

## 2024-03-18 PROCEDURE — 99213 OFFICE O/P EST LOW 20 MIN: CPT | Performed by: OPHTHALMOLOGY

## 2024-03-25 ENCOUNTER — OFFICE (OUTPATIENT)
Dept: URBAN - METROPOLITAN AREA CLINIC 109 | Facility: CLINIC | Age: 49
Setting detail: OPHTHALMOLOGY
End: 2024-03-25
Payer: COMMERCIAL

## 2024-03-25 DIAGNOSIS — S05.02XD: ICD-10-CM

## 2024-03-25 PROBLEM — H16.223 DRY EYE SYNDROME K SICCA; BOTH EYES: Status: ACTIVE | Noted: 2024-03-13

## 2024-03-25 PROCEDURE — 99213 OFFICE O/P EST LOW 20 MIN: CPT | Performed by: OPHTHALMOLOGY

## 2024-03-25 ASSESSMENT — REFRACTION_CURRENTRX
OS_OVR_VA: 20/
OD_OVR_VA: 20/
OS_SPHERE: -2.75
OD_AXIS: 34
OS_CYLINDER: -0.75
OD_CYLINDER: -0.50
OD_SPHERE: -2.75
OS_AXIS: 136

## 2024-04-03 ENCOUNTER — OFFICE (OUTPATIENT)
Dept: URBAN - METROPOLITAN AREA CLINIC 109 | Facility: CLINIC | Age: 49
Setting detail: OPHTHALMOLOGY
End: 2024-04-03
Payer: COMMERCIAL

## 2024-04-03 DIAGNOSIS — H02.014: ICD-10-CM

## 2024-04-03 DIAGNOSIS — S05.02XD: ICD-10-CM

## 2024-04-03 PROCEDURE — 67820 REVISE EYELASHES: CPT | Mod: E1 | Performed by: OPHTHALMOLOGY

## 2024-04-03 PROCEDURE — 99213 OFFICE O/P EST LOW 20 MIN: CPT | Mod: 25 | Performed by: OPHTHALMOLOGY

## 2024-04-03 ASSESSMENT — LID EXAM ASSESSMENTS: OS_TRICHIASIS: 1+

## 2024-04-12 RX ORDER — VALACYCLOVIR 1 G/1
1 TABLET, FILM COATED ORAL TWICE DAILY
Qty: 4 | Refills: 3 | Status: ACTIVE | COMMUNITY
Start: 2021-11-12 | End: 1900-01-01

## 2024-04-22 DIAGNOSIS — Z12.31 ENCOUNTER FOR SCREENING MAMMOGRAM FOR MALIGNANT NEOPLASM OF BREAST: ICD-10-CM

## 2024-04-22 DIAGNOSIS — R92.30 DENSE BREASTS, UNSPECIFIED: ICD-10-CM

## 2024-07-24 ENCOUNTER — OFFICE (OUTPATIENT)
Dept: URBAN - METROPOLITAN AREA CLINIC 109 | Facility: CLINIC | Age: 49
Setting detail: OPHTHALMOLOGY
End: 2024-07-24
Payer: COMMERCIAL

## 2024-07-24 DIAGNOSIS — S05.02XA: ICD-10-CM

## 2024-07-24 PROCEDURE — 99213 OFFICE O/P EST LOW 20 MIN: CPT | Performed by: OPHTHALMOLOGY

## 2024-07-24 ASSESSMENT — SUPERFICIAL PUNCTATE KERATITIS (SPK)
OD_SPK: 2+
OS_SPK: 2+
OD_SPK: 2+
OS_SPK: 2+

## 2024-07-24 ASSESSMENT — CONFRONTATIONAL VISUAL FIELD TEST (CVF)
OS_FINDINGS: FULL
OD_FINDINGS: FULL

## 2024-07-24 ASSESSMENT — CORNEAL TRAUMA - ABRASION
OD_ABRASION: ABSENT
OS_ABRASION: PRESENT
OD_ABRASION: ABSENT
OS_ABRASION: PRESENT

## 2024-07-24 ASSESSMENT — LID EXAM ASSESSMENTS: OS_TRICHIASIS: 1+

## 2024-07-30 ENCOUNTER — APPOINTMENT (OUTPATIENT)
Dept: OBGYN | Facility: CLINIC | Age: 49
End: 2024-07-30

## 2024-07-31 ENCOUNTER — OFFICE (OUTPATIENT)
Dept: URBAN - METROPOLITAN AREA CLINIC 109 | Facility: CLINIC | Age: 49
Setting detail: OPHTHALMOLOGY
End: 2024-07-31
Payer: COMMERCIAL

## 2024-07-31 DIAGNOSIS — S05.02XD: ICD-10-CM

## 2024-07-31 PROBLEM — H16.223 DRY EYE SYNDROME K SICCA; BOTH EYES: Status: ACTIVE | Noted: 2024-07-24

## 2024-07-31 PROCEDURE — 99213 OFFICE O/P EST LOW 20 MIN: CPT | Performed by: OPHTHALMOLOGY

## 2024-07-31 ASSESSMENT — CONFRONTATIONAL VISUAL FIELD TEST (CVF)
OS_FINDINGS: FULL
OD_FINDINGS: FULL

## 2024-07-31 ASSESSMENT — LID EXAM ASSESSMENTS: OS_TRICHIASIS: 1+

## 2024-10-21 ENCOUNTER — NON-APPOINTMENT (OUTPATIENT)
Age: 49
End: 2024-10-21

## 2024-10-21 ENCOUNTER — APPOINTMENT (OUTPATIENT)
Dept: OBGYN | Facility: CLINIC | Age: 49
End: 2024-10-21
Payer: COMMERCIAL

## 2024-10-21 VITALS
DIASTOLIC BLOOD PRESSURE: 94 MMHG | WEIGHT: 190 LBS | SYSTOLIC BLOOD PRESSURE: 155 MMHG | HEIGHT: 64 IN | BODY MASS INDEX: 32.44 KG/M2

## 2024-10-21 DIAGNOSIS — Z01.419 ENCOUNTER FOR GYNECOLOGICAL EXAMINATION (GENERAL) (ROUTINE) W/OUT ABNORMAL FINDINGS: ICD-10-CM

## 2024-10-21 DIAGNOSIS — Z97.5 PRESENCE OF (INTRAUTERINE) CONTRACEPTIVE DEVICE: ICD-10-CM

## 2024-10-21 DIAGNOSIS — N93.9 ABNORMAL UTERINE AND VAGINAL BLEEDING, UNSPECIFIED: ICD-10-CM

## 2024-10-21 DIAGNOSIS — Z13.31 ENCOUNTER FOR SCREENING FOR DEPRESSION: ICD-10-CM

## 2024-10-21 LAB
ESTRADIOL SERPL-MCNC: 163 PG/ML
FSH SERPL-MCNC: 10.5 IU/L
HCG UR QL: NEGATIVE
LH SERPL-ACNC: 10 IU/L
T4 FREE SERPL-MCNC: 1.4 NG/DL
TSH SERPL-ACNC: 0.86 UIU/ML

## 2024-10-21 PROCEDURE — 99396 PREV VISIT EST AGE 40-64: CPT | Mod: 25

## 2024-10-21 PROCEDURE — 81025 URINE PREGNANCY TEST: CPT

## 2024-10-21 PROCEDURE — 99459 PELVIC EXAMINATION: CPT

## 2024-10-21 PROCEDURE — 58100 BIOPSY OF UTERUS LINING: CPT | Mod: 59

## 2024-10-21 PROCEDURE — 36415 COLL VENOUS BLD VENIPUNCTURE: CPT

## 2024-10-21 PROCEDURE — 82270 OCCULT BLOOD FECES: CPT

## 2024-10-22 LAB — HPV HIGH+LOW RISK DNA PNL CVX: NOT DETECTED

## 2024-10-25 LAB — CYTOLOGY CVX/VAG DOC THIN PREP: NORMAL

## 2024-11-01 ENCOUNTER — ASOB RESULT (OUTPATIENT)
Age: 49
End: 2024-11-01

## 2024-11-01 ENCOUNTER — APPOINTMENT (OUTPATIENT)
Dept: OBGYN | Facility: CLINIC | Age: 49
End: 2024-11-01
Payer: COMMERCIAL

## 2024-11-01 PROCEDURE — 76830 TRANSVAGINAL US NON-OB: CPT

## 2024-11-06 ENCOUNTER — APPOINTMENT (OUTPATIENT)
Dept: OBGYN | Facility: CLINIC | Age: 49
End: 2024-11-06
Payer: COMMERCIAL

## 2024-11-06 ENCOUNTER — ASOB RESULT (OUTPATIENT)
Age: 49
End: 2024-11-06

## 2024-11-06 PROCEDURE — 76831 ECHO EXAM UTERUS: CPT

## 2024-11-06 PROCEDURE — ZZZZZ: CPT

## 2024-11-06 PROCEDURE — 81025 URINE PREGNANCY TEST: CPT

## 2024-11-06 PROCEDURE — 58340 CATHETER FOR HYSTEROGRAPHY: CPT

## 2024-11-08 LAB — HCG UR QL: NEGATIVE

## 2024-12-02 ENCOUNTER — APPOINTMENT (OUTPATIENT)
Dept: OBGYN | Facility: CLINIC | Age: 49
End: 2024-12-02

## 2024-12-03 DIAGNOSIS — N80.03 ADENOMYOSIS OF THE UTERUS: ICD-10-CM

## 2024-12-10 ENCOUNTER — OUTPATIENT (OUTPATIENT)
Dept: OUTPATIENT SERVICES | Facility: HOSPITAL | Age: 49
LOS: 1 days | End: 2024-12-10
Payer: COMMERCIAL

## 2024-12-10 VITALS
OXYGEN SATURATION: 97 % | TEMPERATURE: 98 F | HEART RATE: 92 BPM | RESPIRATION RATE: 18 BRPM | SYSTOLIC BLOOD PRESSURE: 136 MMHG | HEIGHT: 64 IN | DIASTOLIC BLOOD PRESSURE: 86 MMHG | WEIGHT: 197.98 LBS

## 2024-12-10 DIAGNOSIS — N84.0 POLYP OF CORPUS UTERI: ICD-10-CM

## 2024-12-10 DIAGNOSIS — Z98.890 OTHER SPECIFIED POSTPROCEDURAL STATES: Chronic | ICD-10-CM

## 2024-12-10 LAB
ANION GAP SERPL CALC-SCNC: 17 MMOL/L — SIGNIFICANT CHANGE UP (ref 5–17)
BUN SERPL-MCNC: 13 MG/DL — SIGNIFICANT CHANGE UP (ref 7–23)
CALCIUM SERPL-MCNC: 9.2 MG/DL — SIGNIFICANT CHANGE UP (ref 8.4–10.5)
CHLORIDE SERPL-SCNC: 105 MMOL/L — SIGNIFICANT CHANGE UP (ref 96–108)
CO2 SERPL-SCNC: 21 MMOL/L — LOW (ref 22–31)
CREAT SERPL-MCNC: 0.56 MG/DL — SIGNIFICANT CHANGE UP (ref 0.5–1.3)
EGFR: 110 ML/MIN/1.73M2 — SIGNIFICANT CHANGE UP
GLUCOSE SERPL-MCNC: 102 MG/DL — HIGH (ref 70–99)
HCT VFR BLD CALC: 41.9 % — SIGNIFICANT CHANGE UP (ref 34.5–45)
HGB BLD-MCNC: 14.6 G/DL — SIGNIFICANT CHANGE UP (ref 11.5–15.5)
MCHC RBC-ENTMCNC: 31.6 PG — SIGNIFICANT CHANGE UP (ref 27–34)
MCHC RBC-ENTMCNC: 34.8 G/DL — SIGNIFICANT CHANGE UP (ref 32–36)
MCV RBC AUTO: 90.7 FL — SIGNIFICANT CHANGE UP (ref 80–100)
NRBC # BLD: 0 /100 WBCS — SIGNIFICANT CHANGE UP (ref 0–0)
PLATELET # BLD AUTO: 397 K/UL — SIGNIFICANT CHANGE UP (ref 150–400)
POTASSIUM SERPL-MCNC: 4 MMOL/L — SIGNIFICANT CHANGE UP (ref 3.5–5.3)
POTASSIUM SERPL-SCNC: 4 MMOL/L — SIGNIFICANT CHANGE UP (ref 3.5–5.3)
RBC # BLD: 4.62 M/UL — SIGNIFICANT CHANGE UP (ref 3.8–5.2)
RBC # FLD: 13.2 % — SIGNIFICANT CHANGE UP (ref 10.3–14.5)
SODIUM SERPL-SCNC: 143 MMOL/L — SIGNIFICANT CHANGE UP (ref 135–145)
WBC # BLD: 8.09 K/UL — SIGNIFICANT CHANGE UP (ref 3.8–10.5)
WBC # FLD AUTO: 8.09 K/UL — SIGNIFICANT CHANGE UP (ref 3.8–10.5)

## 2024-12-10 PROCEDURE — 85027 COMPLETE CBC AUTOMATED: CPT

## 2024-12-10 PROCEDURE — 80048 BASIC METABOLIC PNL TOTAL CA: CPT

## 2024-12-10 PROCEDURE — G0463: CPT

## 2024-12-10 NOTE — H&P PST ADULT - HISTORY OF PRESENT ILLNESS
48 yo F  LMP... with PMHx of uterine polyps with heavy menstruation s/p D&C Operative hysteroscopy endometrial polyp removal and IUD insertion 2/3/23.  She reports menses skipping 2-3 months at a time for past year with heavy bleeding.  Plan for D&C, operative hysteroscopy, polypectomy w/Aveta on 24 with Dr. Mcelroy.   Denies SOB, CP, palpitation, blood in the stool or urine, N/V/D/C, HA, dizziness, seizures. Denies vaginal bleeding/discharge or pelvic pain.            48 yo F  LMP 24 with PMHx of uterine polyps with heavy menstruation s/p D&C Operative hysteroscopy endometrial polyp removal and IUD insertion 2/3/23.  She reports menses skipping 2-3 months at a time for past year with heavy bleeding.  US revealed uterine polyps.  Plan for D&C, operative hysteroscopy, polypectomy w/Aveta on 24 with Dr. Mcelroy.   Denies SOB, CP, palpitation, blood in the stool or urine, N/V/D/C, HA, dizziness, seizures. Denies vaginal bleeding/discharge or pelvic pain.

## 2024-12-10 NOTE — H&P PST ADULT - PROBLEM SELECTOR PLAN 1
Plan for D&C, operative hysteroscopy, polypectomy w/Aveta on 12/20/24 with Dr. Mcelroy.  PST labs sent (CBC, BMP)  UCG DOS, specimen cup provided   Pre procedure instructions discussed  Pre-op education provided - all questions answered

## 2024-12-10 NOTE — H&P PST ADULT - CARDIOVASCULAR
[FreeTextEntry1] : 23-year-old female for new patient physical exam.  Routine labs obtained today.\par \par History of anxiety/depression/binge eating disorder.  Follows with psychiatry.  Continue current regimen.  Patient agreeable to behavioral health care coordination referral for counselor.\par \par Tinnitus.  Chronic.  ENT referral placed.\par \par Cervical lymph node enlargement.  Ultrasound ordered.\par \par Intermittent low back pain.  Chronic.  UA ordered.\par \par Signs symptoms warranting further eval advised.  All questions answered.  Patient voiced understanding and in agreement to above plan.  Return to clinic as recommended. negative regular rate and rhythm/S1 S2 present details…

## 2024-12-10 NOTE — H&P PST ADULT - ASSESSMENT
DASI score:  DASI activity:  Loose teeth or dentures:  Airway:  DASI score: 8.2 METS  DASI activity: Walks for exercise, ADLs, house/yard work. no limitations   Loose teeth or dentures: denies   Airway: MP 2

## 2024-12-10 NOTE — H&P PST ADULT - NSICDXPROCEDURE_GEN_ALL_CORE_FT
PROCEDURES:  D&C (dilatation and curettage, scraping of uterus) 10-Dec-2024 08:06:30  Madiha Richard

## 2024-12-20 ENCOUNTER — APPOINTMENT (OUTPATIENT)
Dept: OBGYN | Facility: HOSPITAL | Age: 49
End: 2024-12-20

## 2024-12-20 ENCOUNTER — OUTPATIENT (OUTPATIENT)
Dept: OUTPATIENT SERVICES | Facility: HOSPITAL | Age: 49
LOS: 1 days | End: 2024-12-20
Payer: COMMERCIAL

## 2024-12-20 ENCOUNTER — TRANSCRIPTION ENCOUNTER (OUTPATIENT)
Age: 49
End: 2024-12-20

## 2024-12-20 ENCOUNTER — RESULT REVIEW (OUTPATIENT)
Age: 49
End: 2024-12-20

## 2024-12-20 VITALS
RESPIRATION RATE: 18 BRPM | DIASTOLIC BLOOD PRESSURE: 85 MMHG | HEART RATE: 72 BPM | OXYGEN SATURATION: 97 % | SYSTOLIC BLOOD PRESSURE: 133 MMHG

## 2024-12-20 VITALS
DIASTOLIC BLOOD PRESSURE: 87 MMHG | OXYGEN SATURATION: 97 % | HEART RATE: 89 BPM | RESPIRATION RATE: 18 BRPM | TEMPERATURE: 97 F | SYSTOLIC BLOOD PRESSURE: 157 MMHG | HEIGHT: 64 IN | WEIGHT: 197.98 LBS

## 2024-12-20 DIAGNOSIS — N84.0 POLYP OF CORPUS UTERI: ICD-10-CM

## 2024-12-20 DIAGNOSIS — Z98.890 OTHER SPECIFIED POSTPROCEDURAL STATES: Chronic | ICD-10-CM

## 2024-12-20 PROCEDURE — 58300 INSERT INTRAUTERINE DEVICE: CPT

## 2024-12-20 PROCEDURE — 58558 HYSTEROSCOPY BIOPSY: CPT

## 2024-12-20 PROCEDURE — 88305 TISSUE EXAM BY PATHOLOGIST: CPT

## 2024-12-20 PROCEDURE — 88305 TISSUE EXAM BY PATHOLOGIST: CPT | Mod: 26

## 2024-12-20 PROCEDURE — C1782: CPT

## 2024-12-20 DEVICE — AVETA SMOL RESECTING DEVICE 2.9MM: Type: IMPLANTABLE DEVICE | Status: FUNCTIONAL

## 2024-12-20 DEVICE — BIRTH CONTROL IUD MIRENA: Type: IMPLANTABLE DEVICE | Status: FUNCTIONAL

## 2024-12-20 RX ORDER — LIDOCAINE HCL 20 MG/ML
0.2 VIAL (ML) INJECTION ONCE
Refills: 0 | Status: COMPLETED | OUTPATIENT
Start: 2024-12-20 | End: 2024-12-20

## 2024-12-20 RX ORDER — HYDROMORPHONE HYDROCHLORIDE 2 MG/1
0.5 TABLET ORAL
Refills: 0 | Status: DISCONTINUED | OUTPATIENT
Start: 2024-12-20 | End: 2024-12-20

## 2024-12-20 RX ORDER — APREPITANT 40 MG/1
40 CAPSULE ORAL ONCE
Refills: 0 | Status: COMPLETED | OUTPATIENT
Start: 2024-12-20 | End: 2024-12-20

## 2024-12-20 RX ORDER — FENTANYL 12 UG/H
50 PATCH, EXTENDED RELEASE TRANSDERMAL
Refills: 0 | Status: DISCONTINUED | OUTPATIENT
Start: 2024-12-20 | End: 2024-12-20

## 2024-12-20 RX ORDER — 0.9 % SODIUM CHLORIDE 0.9 %
1000 INTRAVENOUS SOLUTION INTRAVENOUS
Refills: 0 | Status: ACTIVE | OUTPATIENT
Start: 2024-12-20 | End: 2025-11-18

## 2024-12-20 RX ORDER — ONDANSETRON HYDROCHLORIDE 4 MG/1
4 TABLET, FILM COATED ORAL ONCE
Refills: 0 | Status: ACTIVE | OUTPATIENT
Start: 2024-12-20 | End: 2025-11-18

## 2024-12-20 RX ORDER — SODIUM CHLORIDE 9 MG/ML
3 INJECTION, SOLUTION INTRAMUSCULAR; INTRAVENOUS; SUBCUTANEOUS EVERY 8 HOURS
Refills: 0 | Status: ACTIVE | OUTPATIENT
Start: 2024-12-20 | End: 2025-11-18

## 2024-12-20 RX ADMIN — Medication 100 MILLILITER(S): at 12:28

## 2024-12-20 RX ADMIN — Medication 100 MILLILITER(S): at 11:46

## 2024-12-20 RX ADMIN — APREPITANT 40 MILLIGRAM(S): 40 CAPSULE ORAL at 12:27

## 2024-12-20 RX ADMIN — SODIUM CHLORIDE 3 MILLILITER(S): 9 INJECTION, SOLUTION INTRAMUSCULAR; INTRAVENOUS; SUBCUTANEOUS at 11:45

## 2024-12-20 NOTE — BRIEF OPERATIVE NOTE - OPERATION/FINDINGS
Uterus found to be anteverted. A single 8mm endometrial polyp on the anterior aspect of the uterus was noted on hysteroscopy. Polypoid tissue in the lower uterine segment was also noted. Uterus found to be anteverted, 9cm cavity length. A single 8mm endometrial polyp on the anterior aspect of the uterus was noted on hysteroscopy. Polypoid tissue in the lower uterine segment was also noted.

## 2024-12-20 NOTE — BRIEF OPERATIVE NOTE - NSICDXBRIEFPROCEDURE_GEN_ALL_CORE_FT
PROCEDURES:  Operative hysteroscopy with fluid management system 20-Dec-2024 14:23:53  Dimple Do  Dilation and curettage, uterus 20-Dec-2024 14:24:02  Dimple Do  Polypectomy, uterus 20-Dec-2024 14:24:08  Dimple Do  IUD insertion 20-Dec-2024 14:24:13  Dimple Do

## 2024-12-20 NOTE — ASU DISCHARGE PLAN (ADULT/PEDIATRIC) - FINANCIAL ASSISTANCE
[facility] provides services at a reduced cost to those who are determined to be eligible through [facility]’s financial assistance program. Information regarding [facility]’s financial assistance program can be found by going to [email or web address] or by calling [phone].

## 2024-12-20 NOTE — ASU PREOP CHECKLIST - HAND OFF
Unit RN to OR RN Posterior Auricular Interpolation Flap Division And Inset Text: Division and inset of the posterior auricular interpolation flap was performed to achieve optimal aesthetic result, restore normal anatomic appearance and avoid distortion of normal anatomy, expedite and facilitate wound healing, achieve optimal functional result and because linear closure either not possible or would produce suboptimal result. The patient was prepped and draped in the usual manner. The pedicle was infiltrated with local anesthesia. The pedicle was sectioned with a #15 blade. The pedicle was de-bulked and trimmed to match the shape of the defect. Hemostasis was achieved. The flap donor site and free margin of the flap were secured with deep buried sutures and the wound edges were re-approximated.

## 2024-12-20 NOTE — ASU DISCHARGE PLAN (ADULT/PEDIATRIC) - PROCEDURE
D&C Operative Hysteroscopy Polypectomy with Mandy D&C Operative Hysteroscopy Polypectomy with Aveta, IUD insertion

## 2024-12-20 NOTE — ASU DISCHARGE PLAN (ADULT/PEDIATRIC) - NS MD DC FALL RISK RISK
For information on Fall & Injury Prevention, visit: [email or web address] OR  [email or web address] OR  https://www.cdc.gov/steadi/patient.html

## 2024-12-20 NOTE — ASU DISCHARGE PLAN (ADULT/PEDIATRIC) - CARE PROVIDER_API CALL
Elena Mcelroy  Obstetrics and Gynecology  [address]  [place] [address]  Phone: [phone]  Fax: [phone]  Follow Up Time:

## 2024-12-20 NOTE — ASU DISCHARGE PLAN (ADULT/PEDIATRIC) - NURSING INSTRUCTIONS
OK to take Tylenol/Acetaminophen 1000 mg at 8 PM  for pain and every 6 hours after as needed. OK to take Motrin/Ibuprofen 600 mg 3 hours after taking the Tylenol  and every 6 hours after as needed.

## 2024-12-20 NOTE — BRIEF OPERATIVE NOTE - COMMENTS
Fluid Deficit: 80cc  Dictation Job #:  Fluid Deficit: 80cc  Dictation Job #: [id number] Fluid Deficit: 80cc  IUD Mirena Lot #: [id number]; Expiration Date: 02/2027  Dictation Job #: [id number]

## 2024-12-24 LAB — SURGICAL PATHOLOGY STUDY: SIGNIFICANT CHANGE UP

## 2025-01-08 ENCOUNTER — APPOINTMENT (OUTPATIENT)
Dept: OBGYN | Facility: CLINIC | Age: 50
End: 2025-01-08
Payer: COMMERCIAL

## 2025-01-08 VITALS — DIASTOLIC BLOOD PRESSURE: 85 MMHG | SYSTOLIC BLOOD PRESSURE: 139 MMHG

## 2025-01-08 DIAGNOSIS — N80.03 ADENOMYOSIS OF THE UTERUS: ICD-10-CM

## 2025-01-08 DIAGNOSIS — N84.0 POLYP OF CORPUS UTERI: ICD-10-CM

## 2025-01-08 DIAGNOSIS — Z97.5 PRESENCE OF (INTRAUTERINE) CONTRACEPTIVE DEVICE: ICD-10-CM

## 2025-01-08 PROCEDURE — 99459 PELVIC EXAMINATION: CPT

## 2025-01-08 PROCEDURE — 99212 OFFICE O/P EST SF 10 MIN: CPT

## 2025-06-11 NOTE — H&P PST ADULT - SKIN
Refill passed per [facility] protocols.    Requested Prescriptions   Pending Prescriptions Disp Refills    LEVOTHYROXINE 100 MCG Oral Tab [Pharmacy Med Name: LEVOTHYROXINE 0.100MG (100MCG) TAB] 90 tablet 3     Sig: TAKE 1 TABLET(100 MCG) BY MOUTH DAILY       Thyroid Medication Protocol Passed - 6/11/2025  4:56 PM        
warm and dry/color normal/no rashes

## (undated) DEVICE — CURETTE ENDOMETRIAL GYNOSAMPLER 23.6CM

## (undated) DEVICE — OS FINDER

## (undated) DEVICE — POSITIONER FOAM EGG CRATE ULNAR 2PCS (PINK)

## (undated) DEVICE — ACCESSORY AVETA WASTE MANAGEMENT 3MM

## (undated) DEVICE — DRAPE 1/2 SHEET 40X57"

## (undated) DEVICE — AVETA CORAL HYSTEROSCOPE 4.6MM DISP

## (undated) DEVICE — DRAPE LIGHT HANDLE COVER (GREEN)

## (undated) DEVICE — GOWN TRIMAX LG

## (undated) DEVICE — SOL IRR POUR NS 0.9% 500ML

## (undated) DEVICE — PRESSURE INFUSOR BAG 1000ML

## (undated) DEVICE — WARMING BLANKET UPPER ADULT

## (undated) DEVICE — GLV 6.5 PROTEXIS (WHITE)

## (undated) DEVICE — SOL INJ NS 0.9% 1000ML

## (undated) DEVICE — PREP BETADINE KIT

## (undated) DEVICE — DRSG TELFA 3 X 8

## (undated) DEVICE — NSA-VIDEO TOWER - STRYKER: Type: DURABLE MEDICAL EQUIPMENT

## (undated) DEVICE — Device

## (undated) DEVICE — SPECIMEN CONTAINER 100ML

## (undated) DEVICE — PACK LITHOTOMY

## (undated) DEVICE — AVETA FLUID MANAGEMENT ACCESSORY

## (undated) DEVICE — TUBING FLUID ADMINISTRATION SET PRIM 70"

## (undated) DEVICE — SOL IRR BAG NS 0.9% 3000ML